# Patient Record
Sex: MALE | Race: WHITE | NOT HISPANIC OR LATINO | Employment: OTHER | URBAN - METROPOLITAN AREA
[De-identification: names, ages, dates, MRNs, and addresses within clinical notes are randomized per-mention and may not be internally consistent; named-entity substitution may affect disease eponyms.]

---

## 2024-05-27 SDOH — HEALTH STABILITY: PHYSICAL HEALTH: ON AVERAGE, HOW MANY MINUTES DO YOU ENGAGE IN EXERCISE AT THIS LEVEL?: 60 MIN

## 2024-05-27 SDOH — HEALTH STABILITY: PHYSICAL HEALTH: ON AVERAGE, HOW MANY DAYS PER WEEK DO YOU ENGAGE IN MODERATE TO STRENUOUS EXERCISE (LIKE A BRISK WALK)?: 4 DAYS

## 2024-05-27 ASSESSMENT — SOCIAL DETERMINANTS OF HEALTH (SDOH): HOW OFTEN DO YOU GET TOGETHER WITH FRIENDS OR RELATIVES?: MORE THAN THREE TIMES A WEEK

## 2024-05-31 ENCOUNTER — ANCILLARY PROCEDURE (OUTPATIENT)
Dept: GENERAL RADIOLOGY | Facility: CLINIC | Age: 59
End: 2024-05-31
Attending: PHYSICIAN ASSISTANT
Payer: COMMERCIAL

## 2024-05-31 ENCOUNTER — OFFICE VISIT (OUTPATIENT)
Dept: FAMILY MEDICINE | Facility: CLINIC | Age: 59
End: 2024-05-31
Payer: COMMERCIAL

## 2024-05-31 VITALS
BODY MASS INDEX: 29.52 KG/M2 | TEMPERATURE: 97.5 F | HEART RATE: 76 BPM | DIASTOLIC BLOOD PRESSURE: 110 MMHG | RESPIRATION RATE: 12 BRPM | WEIGHT: 230 LBS | HEIGHT: 74 IN | OXYGEN SATURATION: 96 % | SYSTOLIC BLOOD PRESSURE: 160 MMHG

## 2024-05-31 DIAGNOSIS — Z13.220 LIPID SCREENING: ICD-10-CM

## 2024-05-31 DIAGNOSIS — M25.561 CHRONIC PAIN OF RIGHT KNEE: ICD-10-CM

## 2024-05-31 DIAGNOSIS — Z87.2 HISTORY OF PSORIASIS: ICD-10-CM

## 2024-05-31 DIAGNOSIS — M25.50 MULTIPLE JOINT PAIN: ICD-10-CM

## 2024-05-31 DIAGNOSIS — Z11.59 NEED FOR HEPATITIS C SCREENING TEST: ICD-10-CM

## 2024-05-31 DIAGNOSIS — R07.9 CHEST PAIN, UNSPECIFIED TYPE: ICD-10-CM

## 2024-05-31 DIAGNOSIS — G89.29 CHRONIC PAIN OF RIGHT KNEE: ICD-10-CM

## 2024-05-31 DIAGNOSIS — Z12.11 COLON CANCER SCREENING: ICD-10-CM

## 2024-05-31 DIAGNOSIS — Z11.4 ENCOUNTER FOR SCREENING FOR HIV: ICD-10-CM

## 2024-05-31 DIAGNOSIS — N50.812 LEFT TESTICULAR PAIN: ICD-10-CM

## 2024-05-31 DIAGNOSIS — I10 BENIGN ESSENTIAL HYPERTENSION: ICD-10-CM

## 2024-05-31 DIAGNOSIS — R73.09 ELEVATED GLUCOSE: ICD-10-CM

## 2024-05-31 DIAGNOSIS — Z12.5 SCREENING FOR PROSTATE CANCER: ICD-10-CM

## 2024-05-31 DIAGNOSIS — Z00.00 ROUTINE GENERAL MEDICAL EXAMINATION AT A HEALTH CARE FACILITY: Primary | ICD-10-CM

## 2024-05-31 DIAGNOSIS — G47.30 SLEEP APNEA, UNSPECIFIED TYPE: ICD-10-CM

## 2024-05-31 DIAGNOSIS — B35.6 JOCK ITCH: ICD-10-CM

## 2024-05-31 LAB
ALBUMIN UR-MCNC: NEGATIVE MG/DL
APPEARANCE UR: CLEAR
BASOPHILS # BLD AUTO: 0 10E3/UL (ref 0–0.2)
BASOPHILS NFR BLD AUTO: 1 %
BILIRUB UR QL STRIP: NEGATIVE
COLOR UR AUTO: YELLOW
EOSINOPHIL # BLD AUTO: 0.2 10E3/UL (ref 0–0.7)
EOSINOPHIL NFR BLD AUTO: 3 %
ERYTHROCYTE [DISTWIDTH] IN BLOOD BY AUTOMATED COUNT: 12.3 % (ref 10–15)
GLUCOSE UR STRIP-MCNC: NEGATIVE MG/DL
HCT VFR BLD AUTO: 47.1 % (ref 40–53)
HGB BLD-MCNC: 16.1 G/DL (ref 13.3–17.7)
HGB UR QL STRIP: NEGATIVE
IMM GRANULOCYTES # BLD: 0 10E3/UL
IMM GRANULOCYTES NFR BLD: 0 %
KETONES UR STRIP-MCNC: NEGATIVE MG/DL
LEUKOCYTE ESTERASE UR QL STRIP: NEGATIVE
LYMPHOCYTES # BLD AUTO: 1.8 10E3/UL (ref 0.8–5.3)
LYMPHOCYTES NFR BLD AUTO: 33 %
MCH RBC QN AUTO: 30.9 PG (ref 26.5–33)
MCHC RBC AUTO-ENTMCNC: 34.2 G/DL (ref 31.5–36.5)
MCV RBC AUTO: 90 FL (ref 78–100)
MONOCYTES # BLD AUTO: 0.5 10E3/UL (ref 0–1.3)
MONOCYTES NFR BLD AUTO: 8 %
NEUTROPHILS # BLD AUTO: 3 10E3/UL (ref 1.6–8.3)
NEUTROPHILS NFR BLD AUTO: 55 %
NITRATE UR QL: NEGATIVE
PH UR STRIP: 6 [PH] (ref 5–7)
PLATELET # BLD AUTO: 169 10E3/UL (ref 150–450)
RBC # BLD AUTO: 5.21 10E6/UL (ref 4.4–5.9)
SP GR UR STRIP: 1.02 (ref 1–1.03)
UROBILINOGEN UR STRIP-ACNC: 0.2 E.U./DL
WBC # BLD AUTO: 5.4 10E3/UL (ref 4–11)

## 2024-05-31 PROCEDURE — 83036 HEMOGLOBIN GLYCOSYLATED A1C: CPT | Performed by: PHYSICIAN ASSISTANT

## 2024-05-31 PROCEDURE — 81003 URINALYSIS AUTO W/O SCOPE: CPT | Performed by: PHYSICIAN ASSISTANT

## 2024-05-31 PROCEDURE — 80053 COMPREHEN METABOLIC PANEL: CPT | Performed by: PHYSICIAN ASSISTANT

## 2024-05-31 PROCEDURE — 82570 ASSAY OF URINE CREATININE: CPT | Performed by: PHYSICIAN ASSISTANT

## 2024-05-31 PROCEDURE — G0103 PSA SCREENING: HCPCS | Performed by: PHYSICIAN ASSISTANT

## 2024-05-31 PROCEDURE — 86803 HEPATITIS C AB TEST: CPT | Performed by: PHYSICIAN ASSISTANT

## 2024-05-31 PROCEDURE — 80061 LIPID PANEL: CPT | Performed by: PHYSICIAN ASSISTANT

## 2024-05-31 PROCEDURE — 99214 OFFICE O/P EST MOD 30 MIN: CPT | Mod: 25 | Performed by: PHYSICIAN ASSISTANT

## 2024-05-31 PROCEDURE — 87389 HIV-1 AG W/HIV-1&-2 AB AG IA: CPT | Performed by: PHYSICIAN ASSISTANT

## 2024-05-31 PROCEDURE — 93000 ELECTROCARDIOGRAM COMPLETE: CPT | Performed by: PHYSICIAN ASSISTANT

## 2024-05-31 PROCEDURE — 86431 RHEUMATOID FACTOR QUANT: CPT | Performed by: PHYSICIAN ASSISTANT

## 2024-05-31 PROCEDURE — 85025 COMPLETE CBC W/AUTO DIFF WBC: CPT | Performed by: PHYSICIAN ASSISTANT

## 2024-05-31 PROCEDURE — 86038 ANTINUCLEAR ANTIBODIES: CPT | Performed by: PHYSICIAN ASSISTANT

## 2024-05-31 PROCEDURE — 73562 X-RAY EXAM OF KNEE 3: CPT | Mod: TC | Performed by: RADIOLOGY

## 2024-05-31 PROCEDURE — 71046 X-RAY EXAM CHEST 2 VIEWS: CPT | Mod: TC | Performed by: RADIOLOGY

## 2024-05-31 PROCEDURE — 36415 COLL VENOUS BLD VENIPUNCTURE: CPT | Performed by: PHYSICIAN ASSISTANT

## 2024-05-31 PROCEDURE — 99386 PREV VISIT NEW AGE 40-64: CPT | Performed by: PHYSICIAN ASSISTANT

## 2024-05-31 PROCEDURE — 82043 UR ALBUMIN QUANTITATIVE: CPT | Performed by: PHYSICIAN ASSISTANT

## 2024-05-31 RX ORDER — CLOTRIMAZOLE 1 %
CREAM (GRAM) TOPICAL 2 TIMES DAILY
Qty: 45 G | Refills: 0 | Status: SHIPPED | OUTPATIENT
Start: 2024-05-31 | End: 2024-06-14

## 2024-05-31 RX ORDER — LOSARTAN POTASSIUM 25 MG/1
25 TABLET ORAL DAILY
Qty: 60 TABLET | Refills: 0 | Status: SHIPPED | OUTPATIENT
Start: 2024-05-31 | End: 2024-07-29

## 2024-05-31 RX ORDER — MULTIVITAMIN
1 TABLET ORAL DAILY
COMMUNITY

## 2024-05-31 RX ORDER — PSYLLIUM HUSK 6 G/6G
1 GRANULE ORAL DAILY
COMMUNITY

## 2024-05-31 ASSESSMENT — PATIENT HEALTH QUESTIONNAIRE - PHQ9
SUM OF ALL RESPONSES TO PHQ QUESTIONS 1-9: 5
10. IF YOU CHECKED OFF ANY PROBLEMS, HOW DIFFICULT HAVE THESE PROBLEMS MADE IT FOR YOU TO DO YOUR WORK, TAKE CARE OF THINGS AT HOME, OR GET ALONG WITH OTHER PEOPLE: NOT DIFFICULT AT ALL
SUM OF ALL RESPONSES TO PHQ QUESTIONS 1-9: 5

## 2024-05-31 ASSESSMENT — PAIN SCALES - GENERAL: PAINLEVEL: MILD PAIN (2)

## 2024-05-31 NOTE — PATIENT INSTRUCTIONS
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
## 2024-06-01 LAB
ALBUMIN SERPL BCG-MCNC: 4.7 G/DL (ref 3.5–5.2)
ALP SERPL-CCNC: 92 U/L (ref 40–150)
ALT SERPL W P-5'-P-CCNC: 54 U/L (ref 0–70)
ANION GAP SERPL CALCULATED.3IONS-SCNC: 9 MMOL/L (ref 7–15)
AST SERPL W P-5'-P-CCNC: 44 U/L (ref 0–45)
BILIRUB SERPL-MCNC: 0.9 MG/DL
BUN SERPL-MCNC: 17.4 MG/DL (ref 8–23)
CALCIUM SERPL-MCNC: 9.4 MG/DL (ref 8.8–10.2)
CHLORIDE SERPL-SCNC: 105 MMOL/L (ref 98–107)
CHOLEST SERPL-MCNC: 201 MG/DL
CREAT SERPL-MCNC: 1.03 MG/DL (ref 0.67–1.17)
CREAT UR-MCNC: 141 MG/DL
DEPRECATED HCO3 PLAS-SCNC: 27 MMOL/L (ref 22–29)
EGFRCR SERPLBLD CKD-EPI 2021: 83 ML/MIN/1.73M2
FASTING STATUS PATIENT QL REPORTED: YES
FASTING STATUS PATIENT QL REPORTED: YES
GLUCOSE SERPL-MCNC: 124 MG/DL (ref 70–99)
HCV AB SERPL QL IA: NONREACTIVE
HDLC SERPL-MCNC: 44 MG/DL
HIV 1+2 AB+HIV1 P24 AG SERPL QL IA: NONREACTIVE
LDLC SERPL CALC-MCNC: 134 MG/DL
MICROALBUMIN UR-MCNC: <12 MG/L
MICROALBUMIN/CREAT UR: NORMAL MG/G{CREAT}
NONHDLC SERPL-MCNC: 157 MG/DL
POTASSIUM SERPL-SCNC: 4.9 MMOL/L (ref 3.4–5.3)
PROT SERPL-MCNC: 7.2 G/DL (ref 6.4–8.3)
PSA SERPL DL<=0.01 NG/ML-MCNC: 0.52 NG/ML (ref 0–4.5)
RHEUMATOID FACT SERPL-ACNC: <10 IU/ML
SODIUM SERPL-SCNC: 141 MMOL/L (ref 135–145)
TRIGL SERPL-MCNC: 113 MG/DL

## 2024-06-03 LAB
ANA SER QL IF: NEGATIVE
HBA1C MFR BLD: 6.2 % (ref 0–5.6)

## 2024-06-10 ENCOUNTER — TELEPHONE (OUTPATIENT)
Dept: FAMILY MEDICINE | Facility: CLINIC | Age: 59
End: 2024-06-10
Payer: COMMERCIAL

## 2024-06-10 DIAGNOSIS — E78.5 HYPERLIPIDEMIA LDL GOAL <100: Primary | ICD-10-CM

## 2024-06-10 RX ORDER — ATORVASTATIN CALCIUM 10 MG/1
10 TABLET, FILM COATED ORAL DAILY
Qty: 90 TABLET | Refills: 0 | Status: SHIPPED | OUTPATIENT
Start: 2024-06-10 | End: 2024-08-23

## 2024-06-10 NOTE — TELEPHONE ENCOUNTER
Patient is calling to respond to the cholesterol lab that is elevated.  He would like you to start him on a medication.  Please send it to [facility] in [place] on [address].  Thank you  Gifty Awan

## 2024-06-10 NOTE — TELEPHONE ENCOUNTER
Please contact patient,     I have sent to the pharmacy a prescription for Lipitor 10 mg tablet.  Please take in the evening.  Recommendation follow-up in 2 to 3 months for recheck and discuss toleration of medication.  If you develop any yellowing of the eyes, severely itchy skin, severe muscle aches stop medication and be seen immediately.    Hansel Hernandez PA-C

## 2024-06-17 ENCOUNTER — OFFICE VISIT (OUTPATIENT)
Dept: ORTHOPEDICS | Facility: CLINIC | Age: 59
End: 2024-06-17
Attending: PHYSICIAN ASSISTANT
Payer: COMMERCIAL

## 2024-06-17 VITALS — WEIGHT: 230 LBS | BODY MASS INDEX: 29.93 KG/M2

## 2024-06-17 DIAGNOSIS — M17.10 PATELLOFEMORAL ARTHRITIS: ICD-10-CM

## 2024-06-17 DIAGNOSIS — M76.899 TENDINOSIS OF QUADRICEPS TENDON: Primary | ICD-10-CM

## 2024-06-17 DIAGNOSIS — G89.29 CHRONIC PAIN OF RIGHT KNEE: ICD-10-CM

## 2024-06-17 DIAGNOSIS — M25.561 CHRONIC PAIN OF RIGHT KNEE: ICD-10-CM

## 2024-06-17 PROCEDURE — 99244 OFF/OP CNSLTJ NEW/EST MOD 40: CPT | Performed by: PEDIATRICS

## 2024-06-17 NOTE — PATIENT INSTRUCTIONS
Discussed causes of anterior knee pain and will have patient evaluated by Physical Therapy for work on strength balance.  They will need work on the entire kinetic chain.  They can use a neoprene knee sleeve, ice and OTC medications as needed for pain in the interim.  Low suspicion for internal derangement given current exam, however, would consider further imaging pending clinical course.    Plan:  - Today's Plan of Care:  Home Exercise Program    Discussed activity considerations and other supportive care including Ice/Heat, OTC and other topical medications as needed.    -We also discussed other future treatment options:  Referral to Physical Therapy  MRI if not improving    Follow Up: 6 - 8 weeks and as needed    If you have any further questions for your physician or physician s care team you can call [phone].

## 2024-06-17 NOTE — PROGRESS NOTES
ASSESSMENT & PLAN    Alexsander was seen today for pain.    Diagnoses and all orders for this visit:    Tendinosis of quadriceps tendon    Chronic pain of right knee  -     Orthopedic  Referral    Patellofemoral arthritis      This issue is acute and Unchanged.      ICD-10-CM    1. Tendinosis of quadriceps tendon  M76.899       2. Chronic pain of right knee  M25.561 Orthopedic  Referral    G89.29       3. Patellofemoral arthritis  M17.10         Patient Instructions   Discussed causes of anterior knee pain and will have patient evaluated by Physical Therapy for work on strength balance.  They will need work on the entire kinetic chain.  They can use a neoprene knee sleeve, ice and OTC medications as needed for pain in the interim.  Low suspicion for internal derangement given current exam, however, would consider further imaging pending clinical course.    Plan:  - Today's Plan of Care:  Home Exercise Program    Discussed activity considerations and other supportive care including Ice/Heat, OTC and other topical medications as needed.    -We also discussed other future treatment options:  Referral to Physical Therapy  MRI if not improving    Follow Up: 6 - 8 weeks and as needed    Concerning signs and symptoms were reviewed and all questions were answered at this time.    Ana Young MD [facility]  Sports Medicine Physician  [facility]    -----  Chief Complaint   Patient presents with    Right Knee - Pain       SUBJECTIVE  Alexsander Segundo is a/an 59 year old male who is seen in consultation at the request of  Hansel Hernandez PA-C for evaluation of right knee pain.     The patient is seen by themselves.    Onset: 2-3 month(s) ago. Reports insidious onset without acute precipitating event.  Location of Pain: anterior knee pain, superior aspect of the patella   Worsened by: TTP, biking   Better with: nothing   Treatments tried: stretching   Associated symptoms: small bump anterior  patella    Orthopedic/Surgical history: NO  Social History/Occupation: retired       REVIEW OF SYSTEMS:  Review of Systems    OBJECTIVE:  Wt 104.3 kg (230 lb)   BMI 29.93 kg/m     General: healthy, alert and in no distress  Skin: no suspicious lesions or rash.  CV: distal perfusion intact   Resp: normal respiratory effort without conversational dyspnea   Psych: normal mood and affect  Gait: NORMAL  Neuro: Normal light sensory exam of lower extremity    Right Knee exam  Inspection:      no effusion, swelling of bruising right    Patella:      Crepitus noted in the patellofemoral joint right    Tender:      mild quad tendon right knee    Non Tender:      remainder of knee area right    Knee ROM:      Full active and passive ROM with flexion and extension right    Hip ROM:     Full active and passive ROM bilateral    Strength:      5-/5 with knee extension right    Special Tests:     neg (-) Leonel right       neg (-) anterior drawer right       neg (-) posterior drawer right       neg (-) varus at 0 deg and 30 deg right       neg (-) valgus at 0 deg and 30 deg right    Gait:      normal    Neurovascular:      2+ peripheral pulses bilaterally and brisk capillary refill       sensation grossly intact      RADIOLOGY:  Final results and radiologist's interpretation, available in the [facility] health record.  Images were reviewed with the patient in the office today.  My personal interpretation of the performed imaging:     Reviewed right knee x-rays from 5/31/2024 - no acute abnormality, mild patellofemoral compartment narrowing, mild patella enthesophytes    Review of the result(s) of each unique test - XR

## 2024-06-17 NOTE — LETTER
6/17/2024      Alexsander Segundo  [address] [facility] [address]      Dear Colleague,    Thank you for referring your patient, Alexsander Segundo, to the [facility]. Please see a copy of my visit note below.    ASSESSMENT & PLAN    Alexsander was seen today for pain.    Diagnoses and all orders for this visit:    Tendinosis of quadriceps tendon    Chronic pain of right knee  -     Orthopedic  Referral    Patellofemoral arthritis      This issue is acute and Unchanged.      ICD-10-CM    1. Tendinosis of quadriceps tendon  M76.899       2. Chronic pain of right knee  M25.561 Orthopedic  Referral    G89.29       3. Patellofemoral arthritis  M17.10         Patient Instructions   Discussed causes of anterior knee pain and will have patient evaluated by Physical Therapy for work on strength balance.  They will need work on the entire kinetic chain.  They can use a neoprene knee sleeve, ice and OTC medications as needed for pain in the interim.  Low suspicion for internal derangement given current exam, however, would consider further imaging pending clinical course.    Plan:  - Today's Plan of Care:  Home Exercise Program    Discussed activity considerations and other supportive care including Ice/Heat, OTC and other topical medications as needed.    -We also discussed other future treatment options:  Referral to Physical Therapy  MRI if not improving    Follow Up: 6 - 8 weeks and as needed    Concerning signs and symptoms were reviewed and all questions were answered at this time.    Ana Young MD [facility]  Sports Medicine Physician  [facility]    -----  Chief Complaint   Patient presents with     Right Knee - Pain       SUBJECTIVE  Alexsander Segundo is a/an 59 year old male who is seen in consultation at the request of  Hansel Hernandez PA-C for evaluation of right knee pain.     The patient is seen by themselves.    Onset: 2-3 month(s) ago.  Reports insidious onset without acute precipitating event.  Location of Pain: anterior knee pain, superior aspect of the patella   Worsened by: TTP, biking   Better with: nothing   Treatments tried: stretching   Associated symptoms: small bump anterior patella    Orthopedic/Surgical history: NO  Social History/Occupation: retired       REVIEW OF SYSTEMS:  Review of Systems    OBJECTIVE:  Wt 104.3 kg (230 lb)   BMI 29.93 kg/m     General: healthy, alert and in no distress  Skin: no suspicious lesions or rash.  CV: distal perfusion intact   Resp: normal respiratory effort without conversational dyspnea   Psych: normal mood and affect  Gait: NORMAL  Neuro: Normal light sensory exam of lower extremity    Right Knee exam  Inspection:      no effusion, swelling of bruising right    Patella:      Crepitus noted in the patellofemoral joint right    Tender:      mild quad tendon right knee    Non Tender:      remainder of knee area right    Knee ROM:      Full active and passive ROM with flexion and extension right    Hip ROM:     Full active and passive ROM bilateral    Strength:      5-/5 with knee extension right    Special Tests:     neg (-) Leonel right       neg (-) anterior drawer right       neg (-) posterior drawer right       neg (-) varus at 0 deg and 30 deg right       neg (-) valgus at 0 deg and 30 deg right    Gait:      normal    Neurovascular:      2+ peripheral pulses bilaterally and brisk capillary refill       sensation grossly intact      RADIOLOGY:  Final results and radiologist's interpretation, available in the [facility] health record.  Images were reviewed with the patient in the office today.  My personal interpretation of the performed imaging:     Reviewed right knee x-rays from 5/31/2024 - no acute abnormality, mild patellofemoral compartment narrowing, mild patella enthesophytes    Review of the result(s) of each unique test - XR             Again, thank you for allowing me to participate in the  care of your patient.        Sincerely,        Ana Young MD

## 2024-06-19 ENCOUNTER — ANCILLARY PROCEDURE (OUTPATIENT)
Dept: ULTRASOUND IMAGING | Facility: CLINIC | Age: 59
End: 2024-06-19
Attending: PHYSICIAN ASSISTANT
Payer: COMMERCIAL

## 2024-06-19 ENCOUNTER — ANCILLARY PROCEDURE (OUTPATIENT)
Dept: CARDIOLOGY | Facility: CLINIC | Age: 59
End: 2024-06-19
Attending: PHYSICIAN ASSISTANT
Payer: COMMERCIAL

## 2024-06-19 DIAGNOSIS — N50.812 LEFT TESTICULAR PAIN: ICD-10-CM

## 2024-06-19 DIAGNOSIS — R07.9 CHEST PAIN, UNSPECIFIED TYPE: ICD-10-CM

## 2024-06-19 LAB — LVEF ECHO: NORMAL

## 2024-06-19 PROCEDURE — 76870 US EXAM SCROTUM: CPT | Performed by: STUDENT IN AN ORGANIZED HEALTH CARE EDUCATION/TRAINING PROGRAM

## 2024-06-19 PROCEDURE — 93306 TTE W/DOPPLER COMPLETE: CPT | Performed by: INTERNAL MEDICINE

## 2024-06-19 PROCEDURE — 93976 VASCULAR STUDY: CPT | Performed by: STUDENT IN AN ORGANIZED HEALTH CARE EDUCATION/TRAINING PROGRAM

## 2024-07-27 DIAGNOSIS — I10 BENIGN ESSENTIAL HYPERTENSION: ICD-10-CM

## 2024-07-29 RX ORDER — LOSARTAN POTASSIUM 25 MG/1
25 TABLET ORAL DAILY
Qty: 60 TABLET | Refills: 0 | Status: SHIPPED | OUTPATIENT
Start: 2024-07-29 | End: 2024-08-23

## 2024-08-01 ENCOUNTER — TELEPHONE (OUTPATIENT)
Dept: GASTROENTEROLOGY | Facility: CLINIC | Age: 59
End: 2024-08-01
Payer: COMMERCIAL

## 2024-08-01 DIAGNOSIS — I10 BENIGN ESSENTIAL HYPERTENSION: ICD-10-CM

## 2024-08-01 RX ORDER — LOSARTAN POTASSIUM 25 MG/1
25 TABLET ORAL DAILY
Qty: 60 TABLET | Refills: 0 | OUTPATIENT
Start: 2024-08-01

## 2024-08-01 NOTE — TELEPHONE ENCOUNTER
"Endoscopy Scheduling Screen    Have you had a positive Covid test in the last 14 days?  No    What is your communication preference for Instructions and/or Bowel Prep?   MyChart    What insurance is in the chart?  Other:  [employer]    Ordering/Referring Provider:   ZAHEER ROSENTHAL    (If ordering provider performs procedure, schedule with ordering provider unless otherwise instructed. )    BMI: Estimated body mass index is 29.93 kg/m  as calculated from the following:    Height as of 5/31/24: 1.867 m (6' 1.5\").    Weight as of 6/17/24: 104.3 kg (230 lb).     Sedation Ordered  moderate sedation.   If patient BMI > 50 do not schedule in [facility].    If patient BMI > 45 do not schedule at [facility].    Are you taking methadone or Suboxone?  No    Have you had difficulties, pain, or discomfort during past endoscopy procedures?  No    Are you taking any prescription medications for pain 3 or more times per week?   NO, No RN review required.    Do you have a history of malignant hyperthermia?  No    (Females) Are you currently pregnant?   No     Have you been diagnosed or told you have pulmonary hypertension?   No    Do you have an LVAD?  No    Have you been told you have moderate to severe sleep apnea?  No    Have you been told you have COPD, asthma, or any other lung disease?  No    Do you have any heart conditions?  No     Have you ever had or are you waiting for an organ transplant?  No. Continue scheduling, no site restrictions.    Have you had a stroke or transient ischemic attack (TIA aka \"mini stroke\" in the last 6 months?   No    Have you been diagnosed with or been told you have cirrhosis of the liver?   No    Are you currently on dialysis?   No    Do you need assistance transferring?   No    BMI: Estimated body mass index is 29.93 kg/m  as calculated from the following:    Height as of 5/31/24: 1.867 m (6' 1.5\").    Weight as of 6/17/24: 104.3 kg (230 lb).     Is patients BMI > 40 and scheduling location [facility]?  No    Do " you take an injectable medication for weight loss or diabetes (excluding insulin)?  No    Do you take the medication Naltrexone?  No    Do you take blood thinners?  No       Prep   Are you currently on dialysis or do you have chronic kidney disease?  No    Do you have a diagnosis of diabetes?  No    Do you have a diagnosis of cystic fibrosis (CF)?  No    On a regular basis do you go 3 -5 days between bowel movements?  No    BMI > 40?  No    Preferred Pharmacy:    [id number] [place], [place] - [address] [address]  Phone: [phone] Fax: [phone]      Final Scheduling Details     Procedure scheduled  Colonoscopy    Surgeon:  Ramsey     Date of procedure:  9/6     Pre-OP / PAC:   No - Not required for this site.    Location  [facility] - [facility] - Patient preference.    Sedation   Moderate Sedation - Per order.      Patient Reminders:   You will receive a call from a Nurse to review instructions and health history.  This assessment must be completed prior to your procedure.  Failure to complete the Nurse assessment may result in the procedure being cancelled.      On the day of your procedure, please designate an adult(s) who can drive you home stay with you for the next 24 hours. The medicines used in the exam will make you sleepy. You will not be able to drive.      You cannot take public transportation, ride share services, or non-medical taxi service without a responsible caregiver.  Medical transport services are allowed with the requirement that a responsible caregiver will receive you at your destination.  We require that drivers and caregivers are confirmed prior to your procedure.

## 2024-08-01 NOTE — TELEPHONE ENCOUNTER
Medication Question or Refill    Contacts       Contact Date/Time Type Contact Phone/Fax    08/01/2024 12:37 PM CDT Phone (Incoming) Alexsander Segundo (Self) [phone] ()            What medication are you calling about (include dose and sig)?: losartan (COZAAR) 25 MG tablet     Preferred Pharmacy:  [place] [address] [place] [facility] [address] [facility]  [address] [facility] [address]  Phone: [phone] Fax: [phone]      Controlled Substance Agreement on file:   CSA -- Patient Level:    CSA: None found at the patient level.       Who prescribed the medication?: Noah Hernandez    Do you need a refill? Yes    When did you use the medication last?     Patient offered an appointment? No    Do you have any questions or concerns?  No      Could we send this information to you in [facility] or would you prefer to receive a phone call?:   Patient would prefer a phone call   Okay to leave a detailed message?: Yes at Cell number on file:    Telephone Information:   Mobile [phone]     Rowena ACOSTA [facility]

## 2024-08-05 ENCOUNTER — TELEPHONE (OUTPATIENT)
Dept: FAMILY MEDICINE | Facility: CLINIC | Age: 59
End: 2024-08-05
Payer: COMMERCIAL

## 2024-08-05 NOTE — TELEPHONE ENCOUNTER
Patient is looking for a refill of his losartan (COZAAR) 25 MG tablet.      Nursing advice: Patient was advised that the Prescription(s) was sent to the pharmacy below.  He is to call the pharmacy and ask them to refill it.  He was advised he needs to be seen and was assisted in making that appointment.  Patient verbalized good understanding, agrees with plan and needs no further support.  Thank you. Ignacia Quiñones R.N.       Per OV note dated 5/31/2024 from  Hansel Hernandez PA-C is as follows:     Patient will follow-up in 1 month for blood pressure recheck

## 2024-08-23 ENCOUNTER — OFFICE VISIT (OUTPATIENT)
Dept: FAMILY MEDICINE | Facility: CLINIC | Age: 59
End: 2024-08-23
Payer: COMMERCIAL

## 2024-08-23 VITALS
BODY MASS INDEX: 31.18 KG/M2 | WEIGHT: 230.2 LBS | TEMPERATURE: 97.5 F | SYSTOLIC BLOOD PRESSURE: 147 MMHG | HEIGHT: 72 IN | DIASTOLIC BLOOD PRESSURE: 90 MMHG | HEART RATE: 71 BPM | RESPIRATION RATE: 16 BRPM | OXYGEN SATURATION: 98 %

## 2024-08-23 DIAGNOSIS — I10 BENIGN ESSENTIAL HYPERTENSION: Primary | ICD-10-CM

## 2024-08-23 DIAGNOSIS — E78.5 HYPERLIPIDEMIA LDL GOAL <100: ICD-10-CM

## 2024-08-23 LAB
ANION GAP SERPL CALCULATED.3IONS-SCNC: 10 MMOL/L (ref 7–15)
BUN SERPL-MCNC: 16.8 MG/DL (ref 8–23)
CALCIUM SERPL-MCNC: 8.8 MG/DL (ref 8.8–10.4)
CHLORIDE SERPL-SCNC: 105 MMOL/L (ref 98–107)
CREAT SERPL-MCNC: 0.9 MG/DL (ref 0.67–1.17)
EGFRCR SERPLBLD CKD-EPI 2021: >90 ML/MIN/1.73M2
GLUCOSE SERPL-MCNC: 124 MG/DL (ref 70–99)
HCO3 SERPL-SCNC: 25 MMOL/L (ref 22–29)
POTASSIUM SERPL-SCNC: 4.2 MMOL/L (ref 3.4–5.3)
SODIUM SERPL-SCNC: 140 MMOL/L (ref 135–145)

## 2024-08-23 PROCEDURE — 36415 COLL VENOUS BLD VENIPUNCTURE: CPT | Performed by: PHYSICIAN ASSISTANT

## 2024-08-23 PROCEDURE — 99213 OFFICE O/P EST LOW 20 MIN: CPT | Performed by: PHYSICIAN ASSISTANT

## 2024-08-23 PROCEDURE — 80048 BASIC METABOLIC PNL TOTAL CA: CPT | Performed by: PHYSICIAN ASSISTANT

## 2024-08-23 RX ORDER — ATORVASTATIN CALCIUM 10 MG/1
10 TABLET, FILM COATED ORAL DAILY
Qty: 90 TABLET | Refills: 3 | Status: SHIPPED | OUTPATIENT
Start: 2024-08-23

## 2024-08-23 RX ORDER — LOSARTAN POTASSIUM 50 MG/1
50 TABLET ORAL DAILY
Qty: 90 TABLET | Refills: 1 | Status: SHIPPED | OUTPATIENT
Start: 2024-08-23

## 2024-08-23 ASSESSMENT — PAIN SCALES - GENERAL: PAINLEVEL: NO PAIN (0)

## 2024-08-23 NOTE — PROGRESS NOTES
Assessment & Plan     Hyperlipidemia LDL goal <100  History of hyperlipidemia.  Has been tolerating statin medication without side effects.  Refills of medication provided.  - atorvastatin (LIPITOR) 10 MG tablet  Dispense: 90 tablet; Refill: 3    Benign essential hypertension  History of hypertension.  Patient on 25 mg losartan.  Patient blood pressure not within goal.  Will increase to 50 mg tablet.  Plans for ancillary blood pressure recheck in 1 month.  If remains elevated will increase to 100 mg tablet with plans to follow-up with provider in the following month.    - losartan (COZAAR) 50 MG tablet  Dispense: 90 tablet; Refill: 1  - Basic metabolic panel  (Ca, Cl, CO2, Creat, Gluc, K, Na, BUN)  - Basic metabolic panel  (Ca, Cl, CO2, Creat, Gluc, K, Na, BUN)    Fallon Beltre is a 59 year old, presenting for the following health issues:  Follow Up and Hypertension      8/23/2024     6:58 AM   Additional Questions   Roomed by HASEEB OBRIEN   Accompanied by SELF     History of Present Illness       Hyperlipidemia:  He presents for follow up of hyperlipidemia.   He is taking medication to lower cholesterol. He is not having myalgia or other side effects to statin medications.    Hypertension: He presents for follow up of hypertension.  He does not check blood pressure  regularly outside of the clinic. Outpatient blood pressures have not been over 140/90. He does not follow a low salt diet.     He eats 0-1 servings of fruits and vegetables daily.He consumes 1 sweetened beverage(s) daily.He exercises with enough effort to increase his heart rate 60 or more minutes per day.  He exercises with enough effort to increase his heart rate 4 days per week.   He is taking medications regularly.     Patient has been taking losartan 25 mg daily.  Denies any side effects with this medication.  No chest pain, shortness of breath, leg swelling.  He has been monitoring blood pressure at home and notes systolic pressure  generally in the 140 range and diastolic in the low 90 range.     No muscle aches or side effects with statin medication.  Patient did attend the [facility] fair yesterday.       Exercise 300 minutes/week.      Reports improved compliance with CPAP.      Review of Systems  Constitutional, HEENT, cardiovascular, pulmonary, gi and gu systems are negative, except as otherwise noted.      Objective    BP (!) 147/90   Pulse 71   Temp 97.5  F (36.4  C) (Tympanic)   Resp 16   Ht 1.829 m (6')   Wt 104.4 kg (230 lb 3.2 oz)   SpO2 98%   BMI 31.22 kg/m    Body mass index is 31.22 kg/m .  Physical Exam   GENERAL: alert and no distress  RESP: lungs clear to auscultation - no rales, rhonchi or wheezes  CV: regular rate and rhythm, normal S1 S2, no S3 or S4, no murmur, click or rub, no peripheral edema  MS: no gross musculoskeletal defects noted, no edema    Signed Electronically by: Hansel Hernandez PA-C

## 2024-08-28 ENCOUNTER — TELEPHONE (OUTPATIENT)
Dept: GASTROENTEROLOGY | Facility: CLINIC | Age: 59
End: 2024-08-28
Payer: COMMERCIAL

## 2024-08-28 NOTE — TELEPHONE ENCOUNTER
Called the Pt to complete Pre-Assessment. First Attempt. No answer, Left message.     Samira Mehta RN   Endoscopy Procedure Pre Assessment RN

## 2024-08-28 NOTE — TELEPHONE ENCOUNTER
Noted sleep study ordered, not completed yet.  Please complete stop / bang during pre assessment.  If moderate to severe, will need review with anes.    --------------------------------------------------------------------------------------------------------------------        Pre visit planning completed.      Procedure details:    Patient scheduled for Colonoscopy on 9/6/24.     Arrival time: 1115. Procedure time 1200    Facility location: [facility]; [address] Floor, [place] [address]. Check in location: 2nd Floor at Surgery desk.    Sedation type: Conscious sedation     Pre op exam needed? No.    Indication for procedure:   Colon cancer screening            Chart review:     Electronic implanted devices? No    Recent diagnosis of diverticulitis within the last 6 weeks? No      Medication review:    Diabetic? No    Anticoagulants? No    Weight loss medication/injectable? No GLP-1 medication per patient's medication list.  RN will verify with pre-assessment call.    NSAIDS? Yes.  Ibuprofen (Advil, Motrin).  Holding interval of 1 day before procedure.    Other medication HOLDING recommendations:  Ferrous sulfate (iron supplements): HOLD 7 days before procedure.      Prep for procedure:     Bowel prep recommendation: Standard Miralax  Due to: standard bowel prep.    Prep instructions sent via [employer]         Corinne Kliber, RN  Endoscopy Procedure Pre Assessment RN  [phone] option 4

## 2024-08-29 NOTE — TELEPHONE ENCOUNTER
"STOP- BANG Sleep Apnea Questionnaire    STOP  1. Do you snore loudly (louder than talking or loud enough to be heard through closed doors)? No only sometimes   2. Do you often feel tired, fatigued, or sleepy during daytime? No  3. Has anyone observed you stop breathing during your sleep? No  4. Do you have or are you being treated for high blood pressure? Yes    BANG  1. BMI more than 35/kg/m2? No  2. Age over 50 years old? Yes  3. Neck circumference >16 inches (40cm)?  17 in 2019  4. Gender: Male? Yes    Total score: 4 or 5 - Intermediate/high risk of MICHEAL     Mild Sleep apnea Dx in 2010,       Noted sleep study ordered, not completed yet, as pt stated he cannot get in until December.  Stop / bang was done during pre assessment.            (MG exclusion is STOP-BANG of 5+)    Will send to Dr. Vallecillo for review.     8/30/24 response from Dr. Vallecillo \"approved\" Addendum in red by Mitzi Hendricks RN August 30, 2024, 8:37 AM       -------------------------------------------------------      Pre assessment completed for upcoming procedure.   (Please see previous telephone encounter notes for complete details)    Patient  returned call.       Procedure details:    Arrival time and facility location reviewed.    Pre op exam needed? No.    Designated  policy reviewed. Instructed to have someone stay 6  hours post procedure.       Medication review:    Medications reviewed. Please see supporting documentation below. Holding recommendations discussed (if applicable).   Ferrous Sulfate (iron supplement): HOLD 7 days before procedure.  NSAID medication(s): Ibuprofen (Advil, Motrin): HOLD 1 day before procedure.  Naproxen (Aleve, Naprosyn): HOLD 4 days before procedure.       Prep for procedure:     Procedure prep instructions reviewed.        Any additional information needed:  N/A      Patient  verbalized understanding and had no questions or concerns at this time.      Mitzi Hendricks RN  Endoscopy Procedure Pre " Assessment   [phone] option 4

## 2024-08-30 NOTE — TELEPHONE ENCOUNTER
UPDATE:    Per Dr Vallecillo, patient approved for CS at .    Corinne Kliber, RN  Endoscopy Procedure Pre Assessment RN  [phone] option 4

## 2024-09-06 ENCOUNTER — HOSPITAL ENCOUNTER (OUTPATIENT)
Facility: AMBULATORY SURGERY CENTER | Age: 59
Discharge: HOME | End: 2024-09-06
Attending: COLON & RECTAL SURGERY | Admitting: COLON & RECTAL SURGERY
Payer: COMMERCIAL

## 2024-09-06 VITALS
RESPIRATION RATE: 16 BRPM | TEMPERATURE: 97.1 F | DIASTOLIC BLOOD PRESSURE: 81 MMHG | SYSTOLIC BLOOD PRESSURE: 139 MMHG | HEART RATE: 77 BPM | OXYGEN SATURATION: 94 %

## 2024-09-06 LAB — COLONOSCOPY: NORMAL

## 2024-09-06 PROCEDURE — 45378 DIAGNOSTIC COLONOSCOPY: CPT

## 2024-09-06 PROCEDURE — G8907 PT DOC NO EVENTS ON DISCHARG: HCPCS

## 2024-09-06 PROCEDURE — G8918 PT W/O PREOP ORDER IV AB PRO: HCPCS

## 2024-09-06 RX ORDER — LIDOCAINE 40 MG/G
CREAM TOPICAL
Status: DISCONTINUED | OUTPATIENT
Start: 2024-09-06 | End: 2024-09-07 | Stop reason: HOSPADM

## 2024-09-06 RX ORDER — FLUMAZENIL 0.1 MG/ML
0.2 INJECTION, SOLUTION INTRAVENOUS
Status: DISCONTINUED | OUTPATIENT
Start: 2024-09-06 | End: 2024-09-07 | Stop reason: HOSPADM

## 2024-09-06 RX ORDER — ONDANSETRON 2 MG/ML
4 INJECTION INTRAMUSCULAR; INTRAVENOUS
Status: DISCONTINUED | OUTPATIENT
Start: 2024-09-06 | End: 2024-09-07 | Stop reason: HOSPADM

## 2024-09-06 RX ORDER — NALOXONE HYDROCHLORIDE 0.4 MG/ML
0.2 INJECTION, SOLUTION INTRAMUSCULAR; INTRAVENOUS; SUBCUTANEOUS
Status: DISCONTINUED | OUTPATIENT
Start: 2024-09-06 | End: 2024-09-07 | Stop reason: HOSPADM

## 2024-09-06 RX ORDER — NALOXONE HYDROCHLORIDE 0.4 MG/ML
0.4 INJECTION, SOLUTION INTRAMUSCULAR; INTRAVENOUS; SUBCUTANEOUS
Status: DISCONTINUED | OUTPATIENT
Start: 2024-09-06 | End: 2024-09-07 | Stop reason: HOSPADM

## 2024-09-06 RX ORDER — FENTANYL CITRATE 50 UG/ML
INJECTION, SOLUTION INTRAMUSCULAR; INTRAVENOUS PRN
Status: DISCONTINUED | OUTPATIENT
Start: 2024-09-06 | End: 2024-09-06 | Stop reason: HOSPADM

## 2024-09-06 NOTE — PROGRESS NOTES
Pre-Endoscopy History and Physical     Alexsander Segundo MRN# [id number]   YOB: 1965 Age: 59 year old     Date of Procedure: 9/6/2024  Primary care provider: No Ref-Primary, Physician  Type of Endoscopy: colonoscopy  Reason for Procedure:Colonoscopy with possible biopsy, possible polypectomy  Type of Anesthesia Anticipated: Moderate (conscious) sedation    HPI:    Alexsander is a 59 year old male who will be undergoing the above procedure.      A history and physical has been performed. The patient's medications and allergies have been reviewed. The risks and benefits of the procedure and the sedation options and risks were discussed with the patient.  All questions were answered and informed consent was obtained.      He denies a personal or family history of anesthesia complications or bleeding disorders.     No Known Allergies     Current Outpatient Medications   Medication Sig Dispense Refill    atorvastatin (LIPITOR) 10 MG tablet Take 1 tablet (10 mg) by mouth daily. 90 tablet 3    losartan (COZAAR) 50 MG tablet Take 1 tablet (50 mg) by mouth daily. 90 tablet 1    multivitamin w/minerals (MULTI-VITAMIN) tablet Take 1 tablet by mouth daily      Psyllium (KONSYL DAILY FIBER) 100 % PACK Take 1 packet by mouth daily      Turmeric (QC TUMERIC COMPLEX PO) Take 1 capsule by mouth daily      Ferrous Sulfate (IRON PO) Take 1 tablet by mouth daily (Patient not taking: Reported on 8/23/2024)       Current Facility-Administered Medications   Medication Dose Route Frequency Provider Last Rate Last Admin    lidocaine (LMX4) kit   Topical Q1H PRN Gracie Mustafa MD        lidocaine 1 % 0.1-1 mL  0.1-1 mL Other Q1H PRN Gracie Mustafa MD        ondansetron (ZOFRAN) injection 4 mg  4 mg Intravenous Once PRN Gracie Mustafa MD        sodium chloride (PF) 0.9% PF flush 3 mL  3 mL Intracatheter Q8H Gracie Mustafa MD        sodium chloride (PF) 0.9% PF flush 3 mL  3 mL Intracatheter q1 min  parish Mustafa, rGacie Eduardo MD           There is no problem list on file for this patient.       Past Medical History:   Diagnosis Date    Hypertension         Past Surgical History:   Procedure Laterality Date    COLONOSCOPY  2014       Social History     Tobacco Use    Smoking status: Never    Smokeless tobacco: Never   Substance Use Topics    Alcohol use: Never       Family History   Problem Relation Age of Onset    Diabetes Mother     Hypertension Mother     Hyperlipidemia Mother     Other Cancer Mother     Obesity Mother     Depression Brother     Anxiety Disorder Brother     Mental Illness Brother     Substance Abuse Brother        REVIEW OF SYSTEMS:     5 point ROS negative except as noted above in HPI, including Gen., Resp., CV, GI &  system review.      PHYSICAL EXAM:   /78   Temp 96.8  F (36  C) (Temporal)   Resp 16   SpO2 98%  Estimated body mass index is 31.22 kg/m  as calculated from the following:    Height as of 8/23/24: 1.829 m (6').    Weight as of 8/23/24: 104.4 kg (230 lb 3.2 oz).   GENERAL APPEARANCE: healthy  MENTAL STATUS: alert and oriented x 3  AIRWAY EXAM: Mallampatti Class I (visualization of the soft palate, fauces, uvula, anterior and posterior pillars)  RESP: lungs clear to auscultation - no rales, rhonchi or wheezes  CV: regular rates and rhythm and normal S1 S2, no S3 or S4      DIAGNOSTICS:    Not indicated      IMPRESSION   ASA Class 2 - Mild systemic disease        PLAN:   Colonoscopy with possible biopsy, possible polypectomy.    The above has been forwarded to the consulting provider.      Signed Electronically by: Gracie Mustafa MD  September 6, 2024    .

## 2024-09-23 ENCOUNTER — ALLIED HEALTH/NURSE VISIT (OUTPATIENT)
Dept: FAMILY MEDICINE | Facility: CLINIC | Age: 59
End: 2024-09-23
Payer: COMMERCIAL

## 2024-09-23 VITALS — SYSTOLIC BLOOD PRESSURE: 136 MMHG | DIASTOLIC BLOOD PRESSURE: 80 MMHG

## 2024-09-23 DIAGNOSIS — I10 BENIGN ESSENTIAL HYPERTENSION: Primary | ICD-10-CM

## 2024-09-23 PROCEDURE — 99207 PR NO CHARGE NURSE ONLY: CPT

## 2024-09-23 NOTE — PROGRESS NOTES
I met with Alexsander Segundo at the request of Noah Hernandez to recheck his blood pressure.  Blood pressure medications on the med list were reviewed with patient.    Patient has taken all medications as per usual regimen: Yes  Patient reports tolerating them without any issues or concerns: Yes    Vitals:    09/23/24 0901   BP: 136/80       Blood pressure was taken, previous encounter was reviewed, recorded blood pressure below 140/90.  Patient was discharged and the note will be sent to the provider for final review.

## 2024-12-16 ASSESSMENT — SLEEP AND FATIGUE QUESTIONNAIRES
HOW LIKELY ARE YOU TO NOD OFF OR FALL ASLEEP WHILE SITTING AND READING: MODERATE CHANCE OF DOZING
HOW LIKELY ARE YOU TO NOD OFF OR FALL ASLEEP IN A CAR, WHILE STOPPED FOR A FEW MINUTES IN TRAFFIC: WOULD NEVER DOZE
HOW LIKELY ARE YOU TO NOD OFF OR FALL ASLEEP WHILE SITTING INACTIVE IN A PUBLIC PLACE: SLIGHT CHANCE OF DOZING
HOW LIKELY ARE YOU TO NOD OFF OR FALL ASLEEP WHILE WATCHING TV: SLIGHT CHANCE OF DOZING
HOW LIKELY ARE YOU TO NOD OFF OR FALL ASLEEP WHILE LYING DOWN TO REST IN THE AFTERNOON WHEN CIRCUMSTANCES PERMIT: SLIGHT CHANCE OF DOZING
HOW LIKELY ARE YOU TO NOD OFF OR FALL ASLEEP WHILE SITTING AND TALKING TO SOMEONE: WOULD NEVER DOZE
HOW LIKELY ARE YOU TO NOD OFF OR FALL ASLEEP WHILE SITTING QUIETLY AFTER LUNCH WITHOUT ALCOHOL: SLIGHT CHANCE OF DOZING
HOW LIKELY ARE YOU TO NOD OFF OR FALL ASLEEP WHEN YOU ARE A PASSENGER IN A CAR FOR AN HOUR WITHOUT A BREAK: SLIGHT CHANCE OF DOZING

## 2024-12-17 ENCOUNTER — OFFICE VISIT (OUTPATIENT)
Dept: SLEEP MEDICINE | Facility: CLINIC | Age: 59
End: 2024-12-17
Attending: PHYSICIAN ASSISTANT
Payer: COMMERCIAL

## 2024-12-17 VITALS
WEIGHT: 235 LBS | HEART RATE: 64 BPM | SYSTOLIC BLOOD PRESSURE: 135 MMHG | BODY MASS INDEX: 31.83 KG/M2 | OXYGEN SATURATION: 97 % | HEIGHT: 72 IN | DIASTOLIC BLOOD PRESSURE: 86 MMHG

## 2024-12-17 DIAGNOSIS — G47.33 OSA (OBSTRUCTIVE SLEEP APNEA): Primary | ICD-10-CM

## 2024-12-17 PROCEDURE — 99204 OFFICE O/P NEW MOD 45 MIN: CPT

## 2024-12-17 NOTE — NURSING NOTE
Chief Complaint   Patient presents with    Sleep Problem     Establishing care. Patient has difficulty staying asleep when using CPAP.        Initial /86   Pulse 64   Ht 1.829 m (6')   Wt 106.6 kg (235 lb)   SpO2 97%   BMI 31.87 kg/m   Estimated body mass index is 31.87 kg/m  as calculated from the following:    Height as of this encounter: 1.829 m (6').    Weight as of this encounter: 106.6 kg (235 lb).    Medication Reconciliation: complete    Neck circumference: 17.75 inches / 45 centimeters.    DME: Peter Hodgson CMA on 12/17/2024 at 8:11 AM

## 2024-12-17 NOTE — PROGRESS NOTES
Outpatient Sleep Medicine Consultation:      Name: Alexsander Segundo MRN# [id number]   Age: 59 year old YOB: 1965     Date of Consultation: December 17, 2024  Consultation is requested by: Hansel Hernandez PA-C  [id number] [place] [address] Hansel Hernandez  Primary care provider: No Ref-Primary, Physician       Reason for Sleep Consult:     Alexsander Segundo is sent by Hansel Hernandez for a sleep consultation regarding previously diagnosed MICHEAL.    Patient s Reason for visit  Alexsander Segundo main reason for visit: (Patient-Rptd) Overdue for a consultation  Patient states problem(s) started: (Patient-Rptd) 13 years ago  Alexsanderangel Segundo's goals for this visit:             Assessment and Plan:     Summary Sleep Diagnoses:  (G47.33) MICHEAL (obstructive sleep apnea) (primary encounter diagnosis)  Comment: Alexsander is a 59-year-old male who presents to the sleep clinic to establish care for previously diagnosed mild to moderate MICHEAL. He was diagnosed in 2010. We do not have a full copy of his sleep study. He did try a mandibular advancement device but that caused him jaw pain. He is now using his CPAP regularly again. He got a new machine in 2019. He is tolerating the CPAP pressure. His download shows excellent compliance. His apnea is well controlled with a residual AHI of 1.3 events per hour. His leak is acceptable. Alexsander does get less headaches with CPAP use, but he feels like he wrestles with the CPAP hose at night. He uses a full face mask and has tried a nasal mask with a chinstrap.     Plan: HST-Home Sleep Apnea Test - Noxturnal Returnable  Recommended a home sleep study to reevaluate severity of apnea and to qualify patient for new supplies and a replacement machine. Instructed Alexsander to discontinue CPAP 2-3 nights prior to his home sleep study. Continue CPAP 8.0 cmH2O in the meantime. Once I have a copy of his old study on file or after his home sleep study is completed, I will  write a prescription for supplies and a replacement machine. We reviewed recommendations for cleaning and replacing supplies.     Comorbid Diagnoses:  Hyperlipidemia, HTN    Summary Recommendations:  Orders Placed This Encounter   Procedures    HST-Home Sleep Apnea Test - Noxturnal Returnable     Summary Counseling:    Sleep Testing Reviewed  Obstructive Sleep Apnea Reviewed  Complications of Untreated Sleep Apnea Reviewed    Patient will follow up in 1 year.  RAIN Macias CNP    Total time spent reviewing medical records, history and physical examination, review of previous testing and interpretation as well as documentation on this date: 58 minutes     CC: Hansel Hernandez PA-C           History of Present Illness:     Alexsander presents to the sleep clinic to establish care for previously diagnosed mild to moderate MICHEAL.    He did try a dental appliance but that caused him jaw pain.     He does get less headaches with CPAP use. He feels like he wrestles with the CPAP hose at night.     He is tolerating his CPAP pressures.     He denies air hunger.     He got his machine on 03/04/2019.     He sleeps in a separate room than his wife.     Past Sleep Evaluations: PSG on 02/02/2010 at [facility] at roughly 210 lbs. AHI was 8.2 events per hour, RDI was 25.3 events per hour. O2 nataliya 85% It appears he got supplies from [employer] at some point.     Snoring: Sometimes   Mask Leak: No  Type of Mask: full face mask. He has tried a nasal mask with a chin strap.   Dry Nose or Mouth: No  Condensation in hose or mask: Yes  Nasal/Skin irritation: No    DME: He would [facility] to [facility] to [facility].     ResMed AirSense 10  CPAP 8.0 cmH2O: 11/17/2024-12/16/2024  The compliance data shows that the patient used the CPAP for 27/30 nights, 87% of nights for >4 hours.  The 95th% leak is 2.5 lpm.  The average nightly usage is 6 hours 23 minutes.  The average AHI is 1.3 events/hr.    SLEEP-WAKE SCHEDULE:     Work/School  Days: Patient goes to school/work: (Patient-Rptd) No   Usually gets into bed at (Patient-Rptd) 11:00 He is tired at this time.   Takes patient about (Patient-Rptd) 10-30 minutes to fall asleep  Has trouble falling asleep (Patient-Rptd) 0 nights per week  Wakes up in the middle of the night (Patient-Rptd) 2-10 times a night.   Wakes up due to (Patient-Rptd) Snorting self awake;Pain;External stimuli (bed partner, pets, noise, etc);Use the bathroom;Uncertain He does not wake frequently to use the bathroom. His mind will race in the night. His cat can wake him in the night. His shoulders hurt in the night.   He has trouble falling back asleep (Patient-Rptd) 10 times times a week.   It usually takes (Patient-Rptd) 10 - 30 minutes sometimes to get back to sleep  Patient is usually up at (Patient-Rptd) 6am  Uses alarm: (Patient-Rptd) No    Weekends/Non-work Days/All Other Days:  Usually gets into bed at (Patient-Rptd) 10   Takes patient about (Patient-Rptd) 10- 30 minutes to fall asleep  Patient is usually up at (Patient-Rptd) 6am  Uses alarm: (Patient-Rptd) No    Sleep Need  Patient gets (Patient-Rptd) 6ish hours sleep on average   Patient thinks he needs about (Patient-Rptd) 7 sleep    Alexsander Segundo prefers to sleep in this position(s): (Patient-Rptd) Side He cannot stay on his back.   Patient states they do the following activities in bed: (Patient-Rptd) Use phone, computer, or tablet    Naps  Patient takes a purposeful nap (Patient-Rptd) Maybe twice a month and naps are usually (Patient-Rptd) 15-30 minutes in duration  He feels better after a nap: (Patient-Rptd) Yes  He dozes off unintentionally (Patient-Rptd) 10 times days per week. He can be bored during the day.   Patient has had a driving accident or near-miss due to sleepiness/drowsiness: (Patient-Rptd) No    SLEEP DISRUPTIONS:    Breathing/Snoring  Patient snores: (Patient-Rptd) Yes  Other people complain about his snoring: (Patient-Rptd) Yes  Patient has been  told he stops breathing in his sleep: (Patient-Rptd) No  He has issues with the following: (Patient-Rptd) Morning headaches    Movement:  Patient gets pain, discomfort, with an urge to move: (Patient-Rptd) Yes He gets shoulder pain at night, but he denies restless legs.   It happens when he is resting: (Patient-Rptd) Yes  It happens more at night: (Patient-Rptd) Yes  Patient has been told he kicks his legs at night: (Patient-Rptd) No     Behaviors in Sleep:  Alexsander Segundo has experienced the following behaviors while sleeping:    He has experienced sudden muscle weakness during the day: (Patient-Rptd) No  Pt denies bruxism, sleep talking, sleep walking, and dream enactment behavior. Pt denies sleep paralysis, hypnagogue and cataplexy.    Is there anything else you would like your sleep provider to know:      CAFFEINE AND OTHER SUBSTANCES:    Patient consumes caffeinated beverages per day: (Patient-Rptd) 1-2  Last caffeine use is usually: (Patient-Rptd) 3pm  List of any prescribed or over the counter stimulants that patient takes:    List of any prescribed or over the counter sleep medication patient takes: (Patient-Rptd) Tylenol PM very seldom  List of previous sleep medications that patient has tried: (Patient-Rptd) Melatonin He doesn't like melatonin.   Patient drinks alcohol to help them sleep: (Patient-Rptd) No  Patient drinks alcohol near bedtime: (Patient-Rptd) No    Family History:  Patient has a family member been diagnosed with a sleep disorder: (Patient-Rptd) Yes  (Patient-Rptd) Mother He says his mother was very overweight.     Social History: He retired 2 years ago, but he will still work part time.     SCALES:    EPWORTH SLEEPINESS SCALE         12/16/2024     8:49 AM    [place] Sleepiness Scale ( OANH Bermudez  [id number]<br>ESS - [place]/English - Final version - 21 Nov 07 - [facility] [place].)   Sitting and reading Moderate chance of dozing   Watching TV Slight chance of dozing   Sitting, inactive in  a public place (e.g. a theatre or a meeting) Slight chance of dozing   As a passenger in a car for an hour without a break Slight chance of dozing   Lying down to rest in the afternoon when circumstances permit Slight chance of dozing   Sitting and talking to someone Would never doze   Sitting quietly after a lunch without alcohol Slight chance of dozing   In a car, while stopped for a few minutes in traffic Would never doze   [place] Score (MC) 7   [place] Score (Sleep) 7        Patient-reported         INSOMNIA SEVERITY INDEX (ZANDER)          12/16/2024     8:36 AM   Insomnia Severity Index (ZANDER)   Difficulty falling asleep 1    Difficulty staying asleep 2    Problems waking up too early 2    How SATISFIED/DISSATISFIED are you with your CURRENT sleep pattern? 2    How NOTICEABLE to others do you think your sleep problem is in terms of impairing the quality of your life? 0    How WORRIED/DISTRESSED are you about your current sleep problem? 2    To what extent do you consider your sleep problem to INTERFERE with your daily functioning (e.g. daytime fatigue, mood, ability to function at work/daily chores, concentration, memory, mood, etc.) CURRENTLY? 0    ZANDER Total Score 9        Patient-reported       Guidelines for Scoring/Interpretation:  Total score categories:  0-7 = No clinically significant insomnia   8-14 = Subthreshold insomnia   15-21 = Clinical insomnia (moderate severity)  22-28 = Clinical insomnia (severe)  Used via courtesy of www.[employer].va.gov with permission from Xavi Romero PhD., [facility]      STOP BANG         12/17/2024     8:00 AM   STOP BANG Questionnaire (  2008, the American Society of Anesthesiologists, Inc. [employer]   Neck Cir (cm) Clinic: 45 cm   B/P Clinic: 135/86   BMI Clinic: 31.87         GAD7         No data to display                  CAGE-AID         No data to display                CAGE-AID reprinted with permission from the [facility]  "Journal, LILIANA Muhammad. and ISAAK Cardoza, \"Conjoint screening questionnaires for alcohol and drug abuse\" [place] [employer] Journal 94: 135-140, 1995.      PATIENT HEALTH QUESTIONNAIRE-9 (PHQ - 9)        5/31/2024     6:40 AM   PHQ-9 ([employer])   1.  Little interest or pleasure in doing things 3    2.  Feeling down, depressed, or hopeless 0    3.  Trouble falling or staying asleep, or sleeping too much 2    4.  Feeling tired or having little energy 0    5.  Poor appetite or overeating 0    6.  Feeling bad about yourself - or that you are a failure or have let yourself or your family down 0    7.  Trouble concentrating on things, such as reading the newspaper or watching television 0    8.  Moving or speaking so slowly that other people could have noticed. Or the opposite - being so fidgety or restless that you have been moving around a lot more than usual 0    9.  Thoughts that you would be better off dead, or of hurting yourself in some way 0    PHQ-9 Total Score 5   6.  Feeling bad about yourself 0    7.  Trouble concentrating 0    8.  Moving slowly or restless 0    9.  Suicidal or self-harm thoughts 0    1.  Little interest or pleasure in doing things Nearly every day   2.  Feeling down, depressed, or hopeless Not at all   3.  Trouble falling or staying asleep, or sleeping too much More than half the days   4.  Feeling tired or having little energy Not at all   5.  Poor appetite or overeating Not at all   6.  Feeling bad about yourself Not at all   7.  Trouble concentrating Not at all   8.  Moving slowly or restless Not at all   9.  Suicidal or self-harm thoughts Not at all   PHQ-9 via [employer] TOTAL SCORE-----> 5 (Mild depression)   Difficulty at work, home, or with people Not difficult at all       Patient-reported       Developed by Yumiko Coronado, Mei Whitten, Pipe Knowles and colleagues, with an educational jair from [employer] No permission required to reproduce, translate, display or " distribute.        Allergies:    No Known Allergies    Medications:    Current Outpatient Medications   Medication Sig Dispense Refill    atorvastatin (LIPITOR) 10 MG tablet Take 1 tablet (10 mg) by mouth daily. 90 tablet 3    losartan (COZAAR) 50 MG tablet Take 1 tablet (50 mg) by mouth daily. 90 tablet 1    multivitamin w/minerals (MULTI-VITAMIN) tablet Take 1 tablet by mouth daily      Psyllium (KONSYL DAILY FIBER) 100 % PACK Take 1 packet by mouth daily         Problem List:  There are no active problems to display for this patient.       Past Medical/Surgical History:  Past Medical History:   Diagnosis Date    Hypertension      Past Surgical History:   Procedure Laterality Date    COLONOSCOPY  2014    COLONOSCOPY WITH CO2 INSUFFLATION N/A 09/06/2024    Procedure: Colonoscopy with CO2 insufflation;  Surgeon: Gracie Mustafa MD;  Location:  OR       Social History:  Social History     Socioeconomic History    Marital status:      Spouse name: Not on file    Number of children: Not on file    Years of education: Not on file    Highest education level: Not on file   Occupational History    Not on file   Tobacco Use    Smoking status: Never    Smokeless tobacco: Never   Vaping Use    Vaping status: Not on file   Substance and Sexual Activity    Alcohol use: Never    Drug use: Never    Sexual activity: Yes     Partners: Female   Other Topics Concern    Parent/sibling w/ CABG, MI or angioplasty before 65F 55M? Yes     Comment: Father   Social History Narrative    Not on file     Social Drivers of Health     Financial Resource Strain: Low Risk  (5/27/2024)    Financial Resource Strain     Within the past 12 months, have you or your family members you live with been unable to get utilities (heat, electricity) when it was really needed?: No   Food Insecurity: Low Risk  (5/27/2024)    Food Insecurity     Within the past 12 months, did you worry that your food would run out before you got money to buy  more?: No     Within the past 12 months, did the food you bought just not last and you didn t have money to get more?: No   Transportation Needs: Low Risk  (5/27/2024)    Transportation Needs     Within the past 12 months, has lack of transportation kept you from medical appointments, getting your medicines, non-medical meetings or appointments, work, or from getting things that you need?: No   Physical Activity: Sufficiently Active (5/27/2024)    Exercise Vital Sign     Days of Exercise per Week: 4 days     Minutes of Exercise per Session: 60 min   Stress: No Stress Concern Present (5/27/2024)    [employer] - Occupational Stress Questionnaire     Feeling of Stress : Not at all   Social Connections: Unknown (5/27/2024)    Social Connection and Isolation Panel [NHANES]     Frequency of Communication with Friends and Family: Not on file     Frequency of Social Gatherings with Friends and Family: More than three times a week     Attends Mandaen Services: Not on file     Active Member of Clubs or Organizations: Not on file     Attends Club or Organization Meetings: Not on file     Marital Status: Not on file   Interpersonal Safety: Low Risk  (5/31/2024)    Interpersonal Safety     Do you feel physically and emotionally safe where you currently live?: Yes     Within the past 12 months, have you been hit, slapped, kicked or otherwise physically hurt by someone?: No     Within the past 12 months, have you been humiliated or emotionally abused in other ways by your partner or ex-partner?: No   Housing Stability: Low Risk  (5/27/2024)    Housing Stability     Do you have housing? : Yes     Are you worried about losing your housing?: No       Family History:  Family History   Problem Relation Age of Onset    Diabetes Mother     Hypertension Mother     Hyperlipidemia Mother     Other Cancer Mother     Obesity Mother     Sleep Apnea Mother     Depression Brother     Anxiety Disorder Brother      Mental Illness Brother     Substance Abuse Brother        Review of Systems:  A complete review of systems reviewed by me is negative with the exeption of what has been mentioned in the history of present illness.  In the last TWO WEEKS have you experienced any of the following symptoms?  Fevers: (Patient-Rptd) No  Night Sweats: (Patient-Rptd) No  Weight Gain: (Patient-Rptd) No  Pain at Night: (Patient-Rptd) Yes  Double Vision: (Patient-Rptd) No  Changes in Vision: (Patient-Rptd) No  Difficulty Breathing through Nose: (Patient-Rptd) No  Sore Throat in Morning: (Patient-Rptd) No  Dry Mouth in the Morning: (Patient-Rptd) No  Shortness of Breath Lying Flat: (Patient-Rptd) No  Shortness of Breath With Activity: (Patient-Rptd) No  Awakening with Shortness of Breath: (Patient-Rptd) No  Increased Cough: (Patient-Rptd) No  Heart Racing at Night: (Patient-Rptd) No  Swelling in Feet or Legs: (Patient-Rptd) No  Diarrhea at Night: (Patient-Rptd) No  Heartburn at Night: (Patient-Rptd) No  Urinating More than Once at Night: (Patient-Rptd) No  Losing Control of Urine at Night: (Patient-Rptd) No  Joint Pains at Night: (Patient-Rptd) Yes  Headaches in Morning: (Patient-Rptd) Yes  Weakness in Arms or Legs: (Patient-Rptd) No  Depressed Mood: (Patient-Rptd) No  Anxiety: (Patient-Rptd) No     Physical Examination:  Vitals: /86   Pulse 64   Ht 1.829 m (6')   Wt 106.6 kg (235 lb)   SpO2 97%   BMI 31.87 kg/m    BMI= Body mass index is 31.87 kg/m .    Neck Cir (cm): 45 cm    GENERAL APPEARANCE: healthy, alert, no distress, and cooperative  EYES: Eyes grossly normal to inspection, PERRL, and conjunctivae and sclerae normal  NECK: no asymmetry, masses, or scars  RESP: no increased work of breathing noted, no audible cough or wheeze   NEURO: mentation intact and speech normal  PSYCH: mentation appears normal and affect normal/bright         Data: All pertinent previous laboratory data reviewed     Recent Labs   Lab Test  "08/23/24  0732 05/31/24  0802    141   POTASSIUM 4.2 4.9   CHLORIDE 105 105   CO2 25 27   ANIONGAP 10 9   * 124*   BUN 16.8 17.4   CR 0.90 1.03   MARIOLA 8.8 9.4       Recent Labs   Lab Test 05/31/24  0802   WBC 5.4   RBC 5.21   HGB 16.1   HCT 47.1   MCV 90   MCH 30.9   MCHC 34.2   RDW 12.3          Recent Labs   Lab Test 05/31/24  0802   PROTTOTAL 7.2   ALBUMIN 4.7   BILITOTAL 0.9   ALKPHOS 92   AST 44   ALT 54       No results found for: \"TSH\"    No results found for: \"UAMP\", \"UBARB\", \"BENZODIAZEUR\", \"UCANN\", \"UCOC\", \"OPIT\", \"UPCP\"    No results found for: \"IRONSAT\", \"VH29762\", \"CELINA\"    No results found for: \"PH\", \"PHARTERIAL\", \"PO2\", \"HX8XHZBAVHB\", \"SAT\", \"PCO2\", \"HCO3\", \"BASEEXCESS\", \"NAYAN\", \"BEB\"    @LABRCNTIPR(phv:4,pco2v:4,po2v:4,hco3v:4,chin:4,o2per:4)@    Echocardiology: No results found for this or any previous visit (from the past 4320 hours).    Chest x-ray:   XR Chest 2 Views 05/31/2024    Narrative  CHEST TWO VIEWS May 31, 2024 8:04 AM    HISTORY: Chest pain, unspecified type.    COMPARISON: None.    Impression  IMPRESSION: No acute cardiopulmonary disease.    MIN AGUILA MD      SYSTEM ID:  [id number]      Chest CT:   No results found for this or any previous visit from the past 365 days.      PFT: Most Recent [facility] Pulmonary Function Testing    Johnathan Ruano, APRN CNP 12/17/2024   "

## 2025-03-05 DIAGNOSIS — I10 BENIGN ESSENTIAL HYPERTENSION: ICD-10-CM

## 2025-03-05 RX ORDER — LOSARTAN POTASSIUM 50 MG/1
50 TABLET ORAL DAILY
Qty: 90 TABLET | Refills: 1 | Status: SHIPPED | OUTPATIENT
Start: 2025-03-05

## 2025-04-17 ASSESSMENT — SLEEP AND FATIGUE QUESTIONNAIRES
HOW LIKELY ARE YOU TO NOD OFF OR FALL ASLEEP WHILE SITTING AND READING: SLIGHT CHANCE OF DOZING
HOW LIKELY ARE YOU TO NOD OFF OR FALL ASLEEP WHEN YOU ARE A PASSENGER IN A CAR FOR AN HOUR WITHOUT A BREAK: SLIGHT CHANCE OF DOZING
HOW LIKELY ARE YOU TO NOD OFF OR FALL ASLEEP IN A CAR, WHILE STOPPED FOR A FEW MINUTES IN TRAFFIC: WOULD NEVER DOZE
HOW LIKELY ARE YOU TO NOD OFF OR FALL ASLEEP WHILE WATCHING TV: SLIGHT CHANCE OF DOZING
HOW LIKELY ARE YOU TO NOD OFF OR FALL ASLEEP WHILE SITTING INACTIVE IN A PUBLIC PLACE: SLIGHT CHANCE OF DOZING
HOW LIKELY ARE YOU TO NOD OFF OR FALL ASLEEP WHILE SITTING AND TALKING TO SOMEONE: WOULD NEVER DOZE
HOW LIKELY ARE YOU TO NOD OFF OR FALL ASLEEP WHILE LYING DOWN TO REST IN THE AFTERNOON WHEN CIRCUMSTANCES PERMIT: SLIGHT CHANCE OF DOZING
HOW LIKELY ARE YOU TO NOD OFF OR FALL ASLEEP WHILE SITTING QUIETLY AFTER LUNCH WITHOUT ALCOHOL: SLIGHT CHANCE OF DOZING

## 2025-04-22 ENCOUNTER — OFFICE VISIT (OUTPATIENT)
Dept: SLEEP MEDICINE | Facility: CLINIC | Age: 60
End: 2025-04-22
Payer: COMMERCIAL

## 2025-04-22 DIAGNOSIS — G47.33 OSA (OBSTRUCTIVE SLEEP APNEA): ICD-10-CM

## 2025-04-22 NOTE — PROGRESS NOTES
Pt is completing a home sleep test. Pt was instructed on how to put on the Noxturnal T3 device and associated equipment before going to bed and given the opportunity to practice putting it on before leaving the sleep center. Pt was reminded to bring the home sleep test kit back to the center tomorrow, at agreed upon time for download and reporting. Patient was instructed to complete study using the following treatment?  None  Neck circumference: 45 CM / 17.75 inches.  Device number: 17

## 2025-04-23 ENCOUNTER — DOCUMENTATION ONLY (OUTPATIENT)
Dept: SLEEP MEDICINE | Facility: CLINIC | Age: 60
End: 2025-04-23
Payer: COMMERCIAL

## 2025-04-23 NOTE — PROGRESS NOTES
HST POST-STUDY QUESTIONNAIRE    What time did you go to bed?  10:45p  How long do you think it took to fall asleep?  10 minutes  What time did you wake up to start the day?  0540  Did you get up during the night at all?  yes  If you woke up, do you remember approximately what time(s)? 0140 maybe 4 more times but fell back to sleep fast  Did you have any difficulty with the equipment?  No  Did you us any type of treatment with this study?  None  Was the head of the bed elevated? Yes pillows  Did you sleep in a recliner?  No  Did you stop using CPAP at least 3 days before this test?  No the form said 2-3 days, I did 2 ut only used it  for 3 hours on Saturday night.  Any other information you'd like us to know? -

## 2025-05-13 ENCOUNTER — CARE COORDINATION (OUTPATIENT)
Dept: SLEEP MEDICINE | Facility: CLINIC | Age: 60
End: 2025-05-13
Payer: COMMERCIAL

## 2025-05-13 NOTE — PROGRESS NOTES
Received an approval of services letter from the patients insurance company. A copy has been given to the provider original sent to scanning.        MELANI Rodrigues

## 2025-05-19 ENCOUNTER — DOCUMENTATION ONLY (OUTPATIENT)
Dept: SLEEP MEDICINE | Facility: CLINIC | Age: 60
End: 2025-05-19
Payer: COMMERCIAL

## 2025-05-19 DIAGNOSIS — G47.33 OBSTRUCTIVE SLEEP APNEA (ADULT) (PEDIATRIC): Primary | ICD-10-CM

## 2025-05-19 NOTE — PROGRESS NOTES
Patient was offered choice of vendor and chose [facility].  Patient Alexsander Segundo was set up at [place] on May 19, 2025. Patient received a Resmed Airsense 10 Pressures were set at 8 cm H2O.   Patient s ramp is 5 cm H2O for Auto and FLEX/EPR is EPR, 2.  Patient received a Resmed Mask name: AirFit F20 Full Face mask size Medium, heated tubing and heated humidifier.  Patient has the following compliance requirements: usage only.  Patient has a follow up on 12/16/25 with LIZET Bosch

## 2025-05-28 SDOH — HEALTH STABILITY: PHYSICAL HEALTH: ON AVERAGE, HOW MANY MINUTES DO YOU ENGAGE IN EXERCISE AT THIS LEVEL?: 60 MIN

## 2025-05-28 SDOH — HEALTH STABILITY: PHYSICAL HEALTH: ON AVERAGE, HOW MANY DAYS PER WEEK DO YOU ENGAGE IN MODERATE TO STRENUOUS EXERCISE (LIKE A BRISK WALK)?: 3 DAYS

## 2025-05-28 ASSESSMENT — SOCIAL DETERMINANTS OF HEALTH (SDOH): HOW OFTEN DO YOU GET TOGETHER WITH FRIENDS OR RELATIVES?: ONCE A WEEK

## 2025-06-02 ENCOUNTER — OFFICE VISIT (OUTPATIENT)
Dept: FAMILY MEDICINE | Facility: CLINIC | Age: 60
End: 2025-06-02
Attending: PHYSICIAN ASSISTANT
Payer: COMMERCIAL

## 2025-06-02 ENCOUNTER — ANCILLARY PROCEDURE (OUTPATIENT)
Dept: GENERAL RADIOLOGY | Facility: CLINIC | Age: 60
End: 2025-06-02
Attending: PHYSICIAN ASSISTANT
Payer: COMMERCIAL

## 2025-06-02 VITALS
BODY MASS INDEX: 31.33 KG/M2 | OXYGEN SATURATION: 98 % | TEMPERATURE: 97.9 F | WEIGHT: 231.3 LBS | DIASTOLIC BLOOD PRESSURE: 89 MMHG | HEART RATE: 65 BPM | HEIGHT: 72 IN | RESPIRATION RATE: 16 BRPM | SYSTOLIC BLOOD PRESSURE: 138 MMHG

## 2025-06-02 DIAGNOSIS — R73.03 PREDIABETES: ICD-10-CM

## 2025-06-02 DIAGNOSIS — M25.562 LEFT KNEE PAIN, UNSPECIFIED CHRONICITY: ICD-10-CM

## 2025-06-02 DIAGNOSIS — E78.5 HYPERLIPIDEMIA LDL GOAL <100: ICD-10-CM

## 2025-06-02 DIAGNOSIS — Z00.00 ROUTINE GENERAL MEDICAL EXAMINATION AT A HEALTH CARE FACILITY: Primary | ICD-10-CM

## 2025-06-02 DIAGNOSIS — R05.3 CHRONIC COUGH: ICD-10-CM

## 2025-06-02 DIAGNOSIS — R07.9 CHEST PAIN, UNSPECIFIED TYPE: ICD-10-CM

## 2025-06-02 DIAGNOSIS — Z12.5 SCREENING FOR PROSTATE CANCER: ICD-10-CM

## 2025-06-02 DIAGNOSIS — I10 BENIGN ESSENTIAL HYPERTENSION: ICD-10-CM

## 2025-06-02 LAB
ALBUMIN SERPL BCG-MCNC: 4.4 G/DL (ref 3.5–5.2)
ALP SERPL-CCNC: 105 U/L (ref 40–150)
ALT SERPL W P-5'-P-CCNC: 39 U/L (ref 0–70)
ANION GAP SERPL CALCULATED.3IONS-SCNC: 12 MMOL/L (ref 7–15)
AST SERPL W P-5'-P-CCNC: 38 U/L (ref 0–45)
BASOPHILS # BLD AUTO: 0.1 10E3/UL (ref 0–0.2)
BASOPHILS NFR BLD AUTO: 1 %
BILIRUB SERPL-MCNC: 0.7 MG/DL
BUN SERPL-MCNC: 17.3 MG/DL (ref 8–23)
CALCIUM SERPL-MCNC: 9.4 MG/DL (ref 8.8–10.4)
CHLORIDE SERPL-SCNC: 105 MMOL/L (ref 98–107)
CHOLEST SERPL-MCNC: 139 MG/DL
CREAT SERPL-MCNC: 0.9 MG/DL (ref 0.67–1.17)
EGFRCR SERPLBLD CKD-EPI 2021: >90 ML/MIN/1.73M2
EOSINOPHIL # BLD AUTO: 0.2 10E3/UL (ref 0–0.7)
EOSINOPHIL NFR BLD AUTO: 3 %
ERYTHROCYTE [DISTWIDTH] IN BLOOD BY AUTOMATED COUNT: 12.2 % (ref 10–15)
EST. AVERAGE GLUCOSE BLD GHB EST-MCNC: 126 MG/DL
FASTING STATUS PATIENT QL REPORTED: NO
FASTING STATUS PATIENT QL REPORTED: NO
GLUCOSE SERPL-MCNC: 111 MG/DL (ref 70–99)
HBA1C MFR BLD: 6 % (ref 0–5.6)
HCO3 SERPL-SCNC: 22 MMOL/L (ref 22–29)
HCT VFR BLD AUTO: 44.6 % (ref 40–53)
HDLC SERPL-MCNC: 43 MG/DL
HGB BLD-MCNC: 15.5 G/DL (ref 13.3–17.7)
IMM GRANULOCYTES # BLD: 0 10E3/UL
IMM GRANULOCYTES NFR BLD: 0 %
LDLC SERPL CALC-MCNC: 76 MG/DL
LYMPHOCYTES # BLD AUTO: 2.2 10E3/UL (ref 0.8–5.3)
LYMPHOCYTES NFR BLD AUTO: 31 %
MCH RBC QN AUTO: 30.4 PG (ref 26.5–33)
MCHC RBC AUTO-ENTMCNC: 34.8 G/DL (ref 31.5–36.5)
MCV RBC AUTO: 88 FL (ref 78–100)
MONOCYTES # BLD AUTO: 0.6 10E3/UL (ref 0–1.3)
MONOCYTES NFR BLD AUTO: 8 %
NEUTROPHILS # BLD AUTO: 4.2 10E3/UL (ref 1.6–8.3)
NEUTROPHILS NFR BLD AUTO: 57 %
NONHDLC SERPL-MCNC: 96 MG/DL
PLATELET # BLD AUTO: 161 10E3/UL (ref 150–450)
POTASSIUM SERPL-SCNC: 4.1 MMOL/L (ref 3.4–5.3)
PROT SERPL-MCNC: 7.3 G/DL (ref 6.4–8.3)
PSA SERPL DL<=0.01 NG/ML-MCNC: 0.58 NG/ML (ref 0–4.5)
RBC # BLD AUTO: 5.1 10E6/UL (ref 4.4–5.9)
SODIUM SERPL-SCNC: 139 MMOL/L (ref 135–145)
TRIGL SERPL-MCNC: 101 MG/DL
WBC # BLD AUTO: 7.4 10E3/UL (ref 4–11)

## 2025-06-02 PROCEDURE — 3075F SYST BP GE 130 - 139MM HG: CPT | Performed by: PHYSICIAN ASSISTANT

## 2025-06-02 PROCEDURE — 99214 OFFICE O/P EST MOD 30 MIN: CPT | Mod: 25 | Performed by: PHYSICIAN ASSISTANT

## 2025-06-02 PROCEDURE — 73562 X-RAY EXAM OF KNEE 3: CPT | Mod: TC | Performed by: RADIOLOGY

## 2025-06-02 PROCEDURE — 80053 COMPREHEN METABOLIC PANEL: CPT | Performed by: PHYSICIAN ASSISTANT

## 2025-06-02 PROCEDURE — 71046 X-RAY EXAM CHEST 2 VIEWS: CPT | Mod: TC | Performed by: RADIOLOGY

## 2025-06-02 PROCEDURE — 83036 HEMOGLOBIN GLYCOSYLATED A1C: CPT | Performed by: PHYSICIAN ASSISTANT

## 2025-06-02 PROCEDURE — 36415 COLL VENOUS BLD VENIPUNCTURE: CPT | Performed by: PHYSICIAN ASSISTANT

## 2025-06-02 PROCEDURE — 80061 LIPID PANEL: CPT | Performed by: PHYSICIAN ASSISTANT

## 2025-06-02 PROCEDURE — 3044F HG A1C LEVEL LT 7.0%: CPT | Performed by: PHYSICIAN ASSISTANT

## 2025-06-02 PROCEDURE — 1125F AMNT PAIN NOTED PAIN PRSNT: CPT | Performed by: PHYSICIAN ASSISTANT

## 2025-06-02 PROCEDURE — 99396 PREV VISIT EST AGE 40-64: CPT | Performed by: PHYSICIAN ASSISTANT

## 2025-06-02 PROCEDURE — 3079F DIAST BP 80-89 MM HG: CPT | Performed by: PHYSICIAN ASSISTANT

## 2025-06-02 PROCEDURE — G0103 PSA SCREENING: HCPCS | Performed by: PHYSICIAN ASSISTANT

## 2025-06-02 PROCEDURE — 85025 COMPLETE CBC W/AUTO DIFF WBC: CPT | Performed by: PHYSICIAN ASSISTANT

## 2025-06-02 RX ORDER — ATORVASTATIN CALCIUM 10 MG/1
10 TABLET, FILM COATED ORAL DAILY
Qty: 90 TABLET | Refills: 3 | Status: SHIPPED | OUTPATIENT
Start: 2025-06-02

## 2025-06-02 RX ORDER — DOXYCYCLINE 100 MG/1
100 CAPSULE ORAL 2 TIMES DAILY
Qty: 14 CAPSULE | Refills: 0 | Status: SHIPPED | OUTPATIENT
Start: 2025-06-02 | End: 2025-06-09

## 2025-06-02 RX ORDER — PREDNISONE 20 MG/1
40 TABLET ORAL DAILY
Qty: 10 TABLET | Refills: 0 | Status: SHIPPED | OUTPATIENT
Start: 2025-06-02 | End: 2025-06-07

## 2025-06-02 RX ORDER — LOSARTAN POTASSIUM 50 MG/1
50 TABLET ORAL DAILY
Qty: 90 TABLET | Refills: 3 | Status: SHIPPED | OUTPATIENT
Start: 2025-06-02

## 2025-06-02 ASSESSMENT — PAIN SCALES - GENERAL: PAINLEVEL_OUTOF10: MODERATE PAIN (5)

## 2025-06-02 ASSESSMENT — ENCOUNTER SYMPTOMS: COUGH: 1

## 2025-06-02 NOTE — PATIENT INSTRUCTIONS
Patient Education   Preventive Care Advice   This is general advice given by our system to help you stay healthy. However, your care team may have specific advice just for you. Please talk to your care team about your preventive care needs.  Nutrition  Eat 5 or more servings of fruits and vegetables each day.  Try wheat bread, brown rice and whole grain pasta (instead of white bread, rice, and pasta).  Get enough calcium and vitamin D. Check the label on foods and aim for 100% of the RDA (recommended daily allowance).  Lifestyle  Exercise at least 150 minutes each week  (30 minutes a day, 5 days a week).  Do muscle strengthening activities 2 days a week. These help control your weight and prevent disease.  No smoking.  Wear sunscreen to prevent skin cancer.  Have a dental exam and cleaning every 6 months.  Yearly exams  See your health care team every year to talk about:  Any changes in your health.  Any medicines your care team has prescribed.  Preventive care, family planning, and ways to prevent chronic diseases.  Shots (vaccines)   HPV shots (up to age 26), if you've never had them before.  Hepatitis B shots (up to age 59), if you've never had them before.  COVID-19 shot: Get this shot when it's due.  Flu shot: Get a flu shot every year.  Tetanus shot: Get a tetanus shot every 10 years.  Pneumococcal, hepatitis A, and RSV shots: Ask your care team if you need these based on your risk.  Shingles shot (for age 50 and up)  General health tests  Diabetes screening:  Starting at age 35, Get screened for diabetes at least every 3 years.  If you are younger than age 35, ask your care team if you should be screened for diabetes.  Cholesterol test: At age 39, start having a cholesterol test every 5 years, or more often if advised.  Bone density scan (DEXA): At age 50, ask your care team if you should have this scan for osteoporosis (brittle bones).  Hepatitis C: Get tested at least once in your life.  STIs (sexually  transmitted infections)  Before age 24: Ask your care team if you should be screened for STIs.  After age 24: Get screened for STIs if you're at risk. You are at risk for STIs (including HIV) if:  You are sexually active with more than one person.  You don't use condoms every time.  You or a partner was diagnosed with a sexually transmitted infection.  If you are at risk for HIV, ask about PrEP medicine to prevent HIV.  Get tested for HIV at least once in your life, whether you are at risk for HIV or not.  Cancer screening tests  Cervical cancer screening: If you have a cervix, begin getting regular cervical cancer screening tests starting at age 21.  Breast cancer scan (mammogram): If you've ever had breasts, begin having regular mammograms starting at age 40. This is a scan to check for breast cancer.  Colon cancer screening: It is important to start screening for colon cancer at age 45.  Have a colonoscopy test every 10 years (or more often if you're at risk) Or, ask your provider about stool tests like a FIT test every year or Cologuard test every 3 years.  To learn more about your testing options, visit:   .  For help making a decision, visit:   https://bit.ly/uo87262.  Prostate cancer screening test: If you have a prostate, ask your care team if a prostate cancer screening test (PSA) at age 55 is right for you.  Lung cancer screening: If you are a current or former smoker ages 50 to 80, ask your care team if ongoing lung cancer screenings are right for you.  For informational purposes only. Not to replace the advice of your health care provider. Copyright   2023 [employer]. All rights reserved. Clinically reviewed by the [facility]itions Program. [employer] 891321 - REV 01/24.

## 2025-06-02 NOTE — PROGRESS NOTES
Preventive Care Visit  [facility]  Hansel Hernandez PA-C, Physician Assistant - Medical  Jun 2, 2025      Assessment & Plan     (Z00.00) Routine general medical examination at a health care facility  (primary encounter diagnosis)  Comment: Patient here for routine screening  Plan: CBC with platelets and differential          (E78.5) Hyperlipidemia LDL goal <100  Comment: Tolerating Lipitor without side effects.  Refill medications provided.  Plan: Lipid panel reflex to direct LDL Non-fasting,         atorvastatin (LIPITOR) 10 MG tablet          (R73.03) Prediabetes  Comment: Previously elevated hemoglobin A1c consistent with prediabetes.  Discussed recheck  Plan: Hemoglobin A1c, Comprehensive metabolic panel         (BMP + Alb, Alk Phos, ALT, AST, Total. Bili,         TP)          (R05.3) Chronic cough  Comment: Chronic cough more than 8 weeks of cough that has been productive at times.  Previous Augmentin.  Discussed repeat chest x-ray as well as doxycycline and prednisone.  If no improvement follow-up for recheck potential CT scan of chest/pulmonology referral.  Plan: XR Chest 2 Views, doxycycline hyclate         (VIBRAMYCIN) 100 MG capsule, predniSONE         (DELTASONE) 20 MG tablet          (M25.562) Left knee pain, unspecified chronicity  Comment: Left knee pain with positive Apley grind.  Discussed potential for meniscus injury versus arthritis.  Discussed x-ray as well as referral for orthopedics.  Has been ongoing for more than 2 months and has been limited exercise capability.  Unable to walk long distances secondary to pain.  Plan: XR Knee Left 3 Views, Orthopedic          Referral          (I10) Benign essential hypertension  Comment: History of hypertension blood pressure within goal.  Refill of losartan 50 mg provided.  Plan: losartan (COZAAR) 50 MG tablet, Comprehensive         metabolic panel (BMP + Alb, Alk Phos, ALT, AST,        Total. Bili, TP)          (Z12.5)  Screening for prostate cancer  Comment: Recheck PSA  Plan: PSA, screen    History of chest pain that continues to be intermittent.  Has been going on for multiple years.  Had echocardiogram done last year without any obvious concerning findings.  Discussed with patient possibility of more in-depth workup including referral for cardiology.  Patient reports reproducible pain with palpation over the chest wall.  Patient declines any further workup/evaluation of chest discomfort at this time.  Discussed potential other more concerning processes that could be ongoing.  Patient expressed understanding          BMI  Estimated body mass index is 31.37 kg/m  as calculated from the following:    Height as of this encounter: 1.829 m (6').    Weight as of this encounter: 104.9 kg (231 lb 4.8 oz).   Weight management plan: Discussed healthy diet and exercise guidelines    Counseling  Appropriate preventive services were addressed with this patient via screening, questionnaire, or discussion as appropriate for fall prevention, nutrition, physical activity, Tobacco-use cessation, social engagement, weight loss and cognition.  Checklist reviewing preventive services available has been given to the patient.  Reviewed patient's diet, addressing concerns and/or questions.   He is at risk for lack of exercise and has been provided with information to increase physical activity for the benefit of his well-being.     Fallon Harvey is a 60 year old, presenting for the following:  Cough (Cough since April 1st. ) and Physical        6/2/2025     9:17 AM   Additional Questions   Roomed by HASEEB OBRIEN   Accompanied by SELF          Cough      Ongoing left knee pain. Worse when seated for a short period of time. Has been ongoing more severe of the last 2 months.  Has limited walking secondary to knee pain.    Cough for the last 2 months. Has been using more cough drops. Occasional productive. Has been having more congestion. Patient  does question seasonal allergies. No hemoptysis. Was seen on 4/4/25 Augmentin no significant relief of symptoms at that time.     Brother with brain bleed that patient relates to substance use history    Was walking up to 4 miles per day.  Activity limited recently secondary to illness as well as worsening knee pain.    No family history of prostate or colon cancer.      Not a smoker.      Never consumes alcohol     1-2 soda per day.  Caffeinated     Every morning patient wakes with multiple joint pain.  Indicates discomfort over ankles, knees, shoulders.  Ankles worse in the morning and improved with course the day.   Prior negative RF and HELIO testing.      For the last few years has been dealing with intermittent left-sided chest pain.  No pain today at time of evaluation.  Denies any exertional component with this chest pain.  Notes if he presses over the left side of his chest reproduces his discomfort.  Has spoken with his primary care providers about this in the past.  No prior imaging with this.  Denies any shortness of breath or leg swelling.  Left sided chest reproducible with palpation. Had Echo 5/31/204      Sleep apnea - using CPAP      Advance Care Planning    Discussed advance care planning with patient; however, patient declined at this time.        5/28/2025   General Health   How would you rate your overall physical health? Good   Feel stress (tense, anxious, or unable to sleep) Not at all         5/28/2025   Nutrition   Three or more servings of calcium each day? Yes   Diet: Regular (no restrictions)   How many servings of fruit and vegetables per day? (!) 0-1   How many sweetened beverages each day? (!) 2         5/28/2025   Exercise   Days per week of moderate/strenous exercise 3 days   Average minutes spent exercising at this level 60 min         5/28/2025   Social Factors   Frequency of gathering with friends or relatives Once a week   Worry food won't last until get money to buy more No   Food  not last or not have enough money for food? No   Do you have housing? (Housing is defined as stable permanent housing and does not include staying outside in a car, in a tent, in an abandoned building, in an overnight shelter, or couch-surfing.) Yes   Are you worried about losing your housing? No   Lack of transportation? No   Unable to get utilities (heat,electricity)? No         5/28/2025   Fall Risk   Fallen 2 or more times in the past year? No   Trouble with walking or balance? No          5/28/2025   Dental   Dentist two times every year? Yes           5/28/2025   Substance Use   Alcohol more than 3/day or more than 7/wk No   Do you use any other substances recreationally? No     Social History     Tobacco Use    Smoking status: Never    Smokeless tobacco: Never   Substance Use Topics    Alcohol use: Never    Drug use: Never         5/28/2025   STI Screening   New sexual partner(s) since last STI/HIV test? No   Last PSA:   Prostate Specific Antigen Screen   Date Value Ref Range Status   05/31/2024 0.52 0.00 - 4.50 ng/mL Final     ASCVD Risk   The 10-year ASCVD risk score (Angela MCDONNELL, et al., 2019) is: 13.5%    Values used to calculate the score:      Age: 60 years      Sex: Male      Is Non- : No      Diabetic: No      Tobacco smoker: No      Systolic Blood Pressure: 138 mmHg      Is BP treated: Yes      HDL Cholesterol: 44 mg/dL      Total Cholesterol: 201 mg/dL      Reviewed and updated as needed this visit by Provider                    Past Medical History:   Diagnosis Date    Hypertension      Past Surgical History:   Procedure Laterality Date    COLONOSCOPY  2014    COLONOSCOPY WITH CO2 INSUFFLATION N/A 09/06/2024    Procedure: Colonoscopy with CO2 insufflation;  Surgeon: Gracie Mustafa MD;  Location: [facility] OR         Review of Systems  Constitutional, neuro, ENT, endocrine, pulmonary, cardiac, gastrointestinal, genitourinary, musculoskeletal, integument and  psychiatric systems are negative, except as otherwise noted.     Objective    Exam  /89   Pulse 65   Temp 97.9  F (36.6  C) (Tympanic)   Resp 16   Ht 1.829 m (6')   Wt 104.9 kg (231 lb 4.8 oz)   SpO2 98%   BMI 31.37 kg/m     Estimated body mass index is 31.37 kg/m  as calculated from the following:    Height as of this encounter: 1.829 m (6').    Weight as of this encounter: 104.9 kg (231 lb 4.8 oz).    Physical Exam  GENERAL: alert and no distress  EYES: Eyes grossly normal to inspection, PERRL and conjunctivae and sclerae normal  HENT: ear canals and TM's normal, nose and mouth without ulcers or lesions  NECK: no adenopathy, no asymmetry, masses, or scars  RESP: lungs clear to auscultation - no rales, rhonchi or wheezes  CV: regular rates and rhythm, no murmur, click or rub, peripheral pulses strong, and no peripheral edema  ABDOMEN: soft, nontender, no hepatosplenomegaly, no masses and bowel sounds normal  MS: Left knee without instability with varus or valgus stress testing.  Negative Lachman's.  Positive modified Apley grind.  Patellar reflex 2+ bilaterally.  No other obvious bony deformity on examination   SKIN: no suspicious lesions or rashes  NEURO: Normal strength and tone, mentation intact and speech normal  PSYCH: mentation appears normal, affect normal/bright        Signed Electronically by: Hansel Hernandez PA-C

## 2025-06-03 ENCOUNTER — PATIENT OUTREACH (OUTPATIENT)
Dept: CARE COORDINATION | Facility: CLINIC | Age: 60
End: 2025-06-03
Payer: COMMERCIAL

## 2025-06-03 ENCOUNTER — RESULTS FOLLOW-UP (OUTPATIENT)
Dept: FAMILY MEDICINE | Facility: CLINIC | Age: 60
End: 2025-06-03

## 2025-06-05 ENCOUNTER — PATIENT OUTREACH (OUTPATIENT)
Dept: CARE COORDINATION | Facility: CLINIC | Age: 60
End: 2025-06-05
Payer: COMMERCIAL

## 2025-06-17 ENCOUNTER — OFFICE VISIT (OUTPATIENT)
Dept: ORTHOPEDICS | Facility: CLINIC | Age: 60
End: 2025-06-17
Payer: COMMERCIAL

## 2025-06-17 DIAGNOSIS — M25.562 LEFT KNEE PAIN, UNSPECIFIED CHRONICITY: Primary | ICD-10-CM

## 2025-06-17 PROCEDURE — 99213 OFFICE O/P EST LOW 20 MIN: CPT | Performed by: PEDIATRICS

## 2025-06-17 NOTE — PROGRESS NOTES
ASSESSMENT & PLAN    Wes was seen today for pain.    Diagnoses and all orders for this visit:    Left knee pain, unspecified chronicity          See Patient Instructions  Patient Instructions   Likely some underlying degenerative change in the knee, with rcent improvement.  We discussed:  Potential for additional imaging with MRI  Rehab exercises  Support options  Use of medication for symptoms (recent oral steroid)  Trial of injection (steroid vs visco)    With some recent improvement, can continue with monitoring, and follow-up is open ended.    If you have any further questions for your physician or physician s care team you can contact them thru MyChart or by calling [phone].      [facility]    -----  Chief Complaint   Patient presents with    Left Knee - Pain       SUBJECTIVE  Alexsander Segundo is a/an 60 year old male who is seen as a self referral for evaluation of left knee.     The patient is seen by themselves.    Onset: 2-3 month(s) ago. Reports insidious onset without acute precipitating event. Notes that Right knee used to be primary issue, now is left.  Location of Pain: left knee, diffuse knee pain, anterior  Worsened by: Sitting for a period of time, first few steps  Better with:  Treatments tried: Prednisone (notes good benefit from this)  Associated symptoms: Mechanical sx of the right knee,     Orthopedic/Surgical history: NO    6/2/25 FP visit where XR was taken    Social History/Occupation: Occasional construction    **  Above information per rooming staff.  Additional history:  Pain more anterior left knee.  Can limp at times, after getting up and moving.  No clear joint noise. No clear swelling.  Can wake from sleep with pain at times.      Right knee has noted bump anterior knee, points over patella.  Notes can feel like right knee flares up at times.   Different from left knee. May click at times but not as bad as  left.      Recent oral steroid helped with pain for ~8 days, was able to be more active. Biking up to 18 miles.      REVIEW OF SYSTEMS:  Review of Systems    OBJECTIVE:      Bilateral Knee exam    Inspection:   no effusion   no ecchymosis    ROM:      Full active and passive ROM with flexion and extension    Patellar Motion:      Crepitus noted in the patellofemoral joint    Tender: mild focal swelling/prominence right anterior knee, over patella    Non Tender: joint lines    Special Tests:      neg (-) Leonel       neg (-) Lachman       neg (-) anterior drawer       neg (-) posterior drawer       neg (-) varus       neg (-) valgus       no pain with forced extension        RADIOLOGY:  Final results and radiologist's interpretation, available in the [facility] health record.  Images were reviewed with the patient in the office today.  My personal interpretation of the performed imaging: mild PF arthrosis.      EXAM: XR KNEE LEFT 3 VIEWS  LOCATION: [facility]  DATE: 6/2/2025     INDICATION:  Left knee pain, unspecified chronicity  COMPARISON: None.                                                                      IMPRESSION:      No evidence of acute fracture or dislocation.     Joint spaces are preserved. Tiny patellar osteophytes. Trace knee joint effusion.     Soft tissues grossly unremarkable.    ============    EXAM: XR KNEE RIGHT 3 VIEWS  DATE/TIME: 5/31/2024 8:04 AM     INDICATION: Chronic pain of right knee; Chronic pain of right knee  COMPARISON: None available.                                                                       IMPRESSION: Mild narrowing of the patellofemoral compartment. No acute  fracture. Small joint effusion. Small ossicle or spur of the anterior  tibial eminence which may be sequela of remote trauma.        RANIJT JERNIGAN DO

## 2025-06-17 NOTE — Clinical Note
2025      Alexsander Segundo  [address]  [place] [address]      Dear Colleague,    Thank you for referring your patient, Alexsander Segundo, to the [facility]. Please see a copy of my visit note below.    ASSESSMENT & PLAN    There are no diagnoses linked to this encounter.  This issue is {ACUTE/CHRONIC:999127} and {IMPROVING WORSENIN}.      {FOLLOW UP PLANS (Optional):721568}    Jimmy Ridley DO  [facility]    -----  Chief Complaint   Patient presents with    Left Knee - Pain       SUBJECTIVE  Alexsander Segundo is a/an 60 year old male who is seen as a self referral for evaluation of left knee.     The patient is seen by themselves.    Onset: 2-3 month(s) ago. Reports insidious onset without acute precipitating event. Notes that Right knee used to be primary issue, now is left.  Location of Pain: left knee, diffuse knee pain, anterior  Worsened by: Sitting for a period of time, first few steps  Better with:  Treatments tried: Prednisone (notes good benefit from this)  Associated symptoms: Mechanical sx of the right knee,     Orthopedic/Surgical history: NO    25 FP visit where XR was taken    Social History/Occupation: Occasional construction    **  Above information per rooming staff.  Additional history:  Pain more anterior left knee.  Can limp at times, after getting up and moving.  No clear joint noise. No clear swelling.  Can wake from sleep with pain at times.      Right knee has noted bump anterior knee, points over patella.  Notes can feel like right knee flares up at times.   Different from left knee. May click at times but not as bad as left.      Recent oral steroid helped with pain for ~8 days, was able to be more active. Biking up to 18 miles.      REVIEW OF SYSTEMS:  Review of Systems    OBJECTIVE:      Bilateral Knee exam    Inspection:   no effusion   no ecchymosis    ROM:      Full active and passive  ROM with flexion and extension    Patellar Motion:      Crepitus noted in the patellofemoral joint    Tender: {Tenderness:689965}    Non Tender: {non tender:415040}    Special Tests: {Special tests:931331}    Evaluation of ipsilateral kinetic chain:   {ipsilateral kinetic:288051}      RADIOLOGY:  Final results and radiologist's interpretation, available in the [facility] health record.  Images were reviewed with the patient in the office today.  My personal interpretation of the performed imaging: ***      EXAM: XR KNEE LEFT 3 VIEWS  LOCATION: [facility]  DATE: 6/2/2025     INDICATION:  Left knee pain, unspecified chronicity  COMPARISON: None.                                                                      IMPRESSION:      No evidence of acute fracture or dislocation.     Joint spaces are preserved. Tiny patellar osteophytes. Trace knee joint effusion.     Soft tissues grossly unremarkable.    ============    EXAM: XR KNEE RIGHT 3 VIEWS  DATE/TIME: 5/31/2024 8:04 AM     INDICATION: Chronic pain of right knee; Chronic pain of right knee  COMPARISON: None available.                                                                       IMPRESSION: Mild narrowing of the patellofemoral compartment. No acute  fracture. Small joint effusion. Small ossicle or spur of the anterior  tibial eminence which may be sequela of remote trauma.        RANJIT JERNIGAN DO         {[facility] 2021 Documentation (Optional):743290}  {2021 E&M time (Optional):737744}  {Provider  Link to [facility] Help Grid :596958}         Again, thank you for allowing me to participate in the care of your patient.        Sincerely,        Jimmy Ridley DO    Electronically signed

## 2025-07-13 NOTE — PATIENT INSTRUCTIONS
Likely some underlying degenerative change in the knee, with rcent improvement.  We discussed:  Potential for additional imaging with MRI  Rehab exercises  Support options  Use of medication for symptoms (recent oral steroid)  Trial of injection (steroid vs visco)    With some recent improvement, can continue with monitoring, and follow-up is open ended.    If you have any further questions for your physician or physician s care team you can contact them thru [employer] or by calling [phone].

## (undated) DEVICE — PAD CHUX UNDERPAD 23X24" 7136

## (undated) DEVICE — PREP CHLORAPREP 26ML TINTED ORANGE  260815

## (undated) DEVICE — KIT ENDO FIRST STEP DISINFECTANT 200ML W/POUCH EP-4

## (undated) RX ORDER — SIMETHICONE 40MG/0.6ML
SUSPENSION, DROPS(FINAL DOSAGE FORM)(ML) ORAL
Status: DISPENSED
Start: 2024-09-06

## (undated) RX ORDER — FENTANYL CITRATE 50 UG/ML
INJECTION, SOLUTION INTRAMUSCULAR; INTRAVENOUS
Status: DISPENSED
Start: 2024-09-06